# Patient Record
Sex: FEMALE | Race: BLACK OR AFRICAN AMERICAN | NOT HISPANIC OR LATINO | Employment: FULL TIME | URBAN - METROPOLITAN AREA
[De-identification: names, ages, dates, MRNs, and addresses within clinical notes are randomized per-mention and may not be internally consistent; named-entity substitution may affect disease eponyms.]

---

## 2017-12-06 ENCOUNTER — APPOINTMENT (OUTPATIENT)
Dept: GENERAL RADIOLOGY | Facility: CLINIC | Age: 27
End: 2017-12-06
Attending: PHYSICIAN ASSISTANT
Payer: COMMERCIAL

## 2017-12-06 ENCOUNTER — HOSPITAL ENCOUNTER (EMERGENCY)
Facility: CLINIC | Age: 27
Discharge: HOME | End: 2017-12-06
Attending: PHYSICIAN ASSISTANT | Admitting: PHYSICIAN ASSISTANT
Payer: COMMERCIAL

## 2017-12-06 VITALS
WEIGHT: 130 LBS | HEART RATE: 68 BPM | RESPIRATION RATE: 16 BRPM | SYSTOLIC BLOOD PRESSURE: 119 MMHG | DIASTOLIC BLOOD PRESSURE: 77 MMHG | HEIGHT: 60 IN | OXYGEN SATURATION: 100 % | TEMPERATURE: 98.5 F | BODY MASS INDEX: 25.52 KG/M2

## 2017-12-06 DIAGNOSIS — J06.9 URI WITH COUGH AND CONGESTION: ICD-10-CM

## 2017-12-06 LAB
DEPRECATED S PYO AG THROAT QL EIA: NORMAL
FLUAV+FLUBV AG SPEC QL: NEGATIVE
FLUAV+FLUBV AG SPEC QL: NEGATIVE
SPECIMEN SOURCE: NORMAL
SPECIMEN SOURCE: NORMAL

## 2017-12-06 PROCEDURE — 25000125 ZZHC RX 250: Performed by: PHYSICIAN ASSISTANT

## 2017-12-06 PROCEDURE — 87081 CULTURE SCREEN ONLY: CPT | Performed by: PHYSICIAN ASSISTANT

## 2017-12-06 PROCEDURE — 99284 EMERGENCY DEPT VISIT MOD MDM: CPT | Mod: 25

## 2017-12-06 PROCEDURE — 87804 INFLUENZA ASSAY W/OPTIC: CPT | Performed by: PHYSICIAN ASSISTANT

## 2017-12-06 PROCEDURE — 71020 XR CHEST 2 VW: CPT

## 2017-12-06 PROCEDURE — 87077 CULTURE AEROBIC IDENTIFY: CPT | Performed by: PHYSICIAN ASSISTANT

## 2017-12-06 PROCEDURE — 87880 STREP A ASSAY W/OPTIC: CPT | Performed by: PHYSICIAN ASSISTANT

## 2017-12-06 PROCEDURE — 25000132 ZZH RX MED GY IP 250 OP 250 PS 637: Performed by: PHYSICIAN ASSISTANT

## 2017-12-06 RX ORDER — IBUPROFEN 600 MG/1
600 TABLET, FILM COATED ORAL ONCE
Status: COMPLETED | OUTPATIENT
Start: 2017-12-06 | End: 2017-12-06

## 2017-12-06 RX ORDER — ONDANSETRON 4 MG/1
4 TABLET, ORALLY DISINTEGRATING ORAL ONCE
Status: COMPLETED | OUTPATIENT
Start: 2017-12-06 | End: 2017-12-06

## 2017-12-06 RX ADMIN — IBUPROFEN 600 MG: 600 TABLET ORAL at 13:21

## 2017-12-06 RX ADMIN — ONDANSETRON 4 MG: 4 TABLET, ORALLY DISINTEGRATING ORAL at 12:14

## 2017-12-06 ASSESSMENT — ENCOUNTER SYMPTOMS
NAUSEA: 1
LIGHT-HEADEDNESS: 1
VOMITING: 1
SORE THROAT: 1
COUGH: 1
FEVER: 1
HEADACHES: 1

## 2017-12-06 NOTE — ED PROVIDER NOTES
History     Chief Complaint:  URI    HPI   Ana Enriquez is a 27 year old female who presents to the emergency department today for evaluation of upper respiratory infection. The patient reports for the past 2 days she has had congestion, sore throat, cough with production of phlegm, lightheadedness, and headache. She also reports a few episode of vomiting but has been able to tolerate water today. She reports fever of 100.5 yesterday that resolved after Dayquil and Nyquil. The last dose of Dayquil was taken at 1030 this morning. She reports recent exposure to her son who is recovering from strep throat and recently had a cold with ear infection. She denies ear pain.    Allergies:  Penicillins     Medications:    Valtrex   Protopic cream   Hydrocortisone cream     Past Medical History:    Abnormal Pap smear  Migraine   Muscle spasms of head and/or neck     Past Surgical History:    Surgical history reviewed. No pertinent surgical history.     Family History:    History reviewed. No pertinent family history.     Social History:  Smoking Status: Current Every Day Smoker  Alcohol Use: Positive   Marital Status:  Single [1]     Review of Systems   Constitutional: Positive for fever (100.5; resolved).   HENT: Positive for congestion and sore throat.    Respiratory: Positive for cough (with production of phlegm).    Gastrointestinal: Positive for nausea and vomiting.   Neurological: Positive for light-headedness and headaches.   All other systems reviewed and are negative.    Physical Exam     Patient Vitals for the past 24 hrs:   BP Temp Temp src Pulse Resp SpO2 Height Weight   12/06/17 1155 119/77 98.5  F (36.9  C) Temporal 68 16 100 % 1.524 m (5') 59 kg (130 lb)     Physical Exam  Nursing note and vitals reviewed.     GENERAL: Alert, mild distress, non toxic appearing.    HEENT: Normal conjunctiva. No scleral icterus. Normal tympanic membranes bilaterally. Nasal congestion. MMM. Oropharyngeal erythema  with no tonsillar edema, no exudate. No firmness or swelling beneath tongue, no elevation of tongue. Uvula midline. No trismus. Tolerating oral secretions without difficulty.   NECK: Supple. No nuchal rigidity. No neck swelling.   CARDIAC: Normal rate and regular rhythm. Normal heart sounds. No murmurs, rubs, or gallops appreciated.   PULMONARY: CTA bilaterally. Normal breath sounds. No wheezing, crackles, or rhonchi appreciated.  No accessory muscle usage. No stridor.   ABDOMEN: Soft, non distended, non tender.   NEURO: Alert and oriented. Non focal.   MUSCULOSKELETAL: Normal range of motion.   SKIN: Skin is warm and dry. No rashes. No pallor or jaundice.   PSYCH: Normal affect and mood.     Emergency Department Course     Imaging:  Radiology findings were communicated with the patient who voiced understanding of the findings.    Chest XR,  PA & LAT     Negative.   KAY LLOYD MD  Reading per radiology    Laboratory:  Laboratory findings were communicated with the patient who voiced understanding of the findings.    Influenza A/B Antigen (Specimen: Nasopharyngeal): Negative   Rapid Strep Screen (Specimen: Throat): Negative   Beta strep group A culture: Pending     Interventions:  1214 Zofran 4 mg PO  1321 Ibuprofen 600 mg PO    Emergency Department Course:    1155 Nursing notes and vitals reviewed.    1156 I performed an exam of the patient as documented above.     1246 The patient was sent for a Chest XR,  PA & LAT while in the emergency department, results above.     1347 I discussed plan of care with the patient who was in agreement. Patient expressed understanding of the discharge instructions, questions were answered, written instructions provided, and patient discharged in satisfactory condition.      Impression & Plan      Medical Decision Making:  Ana Zena KEVIN Enriquez is a 27 year old female who presents to the emergency department today with symptoms as noted above. Findings at this time were  consistent with uncomplicated URI likely viral in nature without evidence of respiratory compromise, significant toxicity, or dehydration clinically. Rapid strep is negative. There is no clinical evidence of peritonsillar abscess, retropharyngeal abscess, Lemierre's Syndrome, epiglottis, or Fernandez's angina. There is no evidence of a more serious bacterial infection at this time. CXR without evidence of pneumonia. Influenza swab is negative.     In regards to her vomiting, suspect this is related to the viral syndrome. Abdomen benign without marked tenderness, rebound or guarding thus doubt acute intraabdominal process. No clinical evidence of dehydration. Will provide prescriptions for cough suppressant, nasal spray and nausea medication. The patient was counseled regarding supportive care and the importance for close follow up with primary care if not improving. Reviewed reasons to return to ED, including worsening symptoms, persistent high fevers, difficulty breathing, unable to tolerate oral intake, or any new concerns. The patient was in agreement with plan and discharged in satisfactory condition with all questions answered. Work note was given.      Diagnosis:    ICD-10-CM   1. URI with cough and congestion J06.9     Disposition:   The patient is discharged to home.    Discharge Medications:  Zofran  Nasonex  Robitussin     Scribe Disclosure:  Kristi TREVINO, am serving as a scribe at 11:59 AM on 12/6/2017 to document services personally performed by Julieta Sihelds PA-C based on my observations and the provider's statements to me.    [facility] EMERGENCY DEPARTMENT       Julieta Shields PA-C  12/06/17 5324

## 2017-12-06 NOTE — ED NOTES
Patient complaining of cough/cold symptoms since Sunday.  Has been taking OTC drugs with not relief.  Last dose of Dayquil at 1030.      ABCs intact.  Alert and oriented x 3.

## 2017-12-06 NOTE — DISCHARGE INSTRUCTIONS

## 2017-12-06 NOTE — ED AVS SNAPSHOT
[facility] Emergency Department    [address]    [facility] [address]    Phone:  [phone]    Fax:  [phone]                                       Ana Enriquez   MRN: [id number]    Department:  [facility] Emergency Department   Date of Visit:  12/6/2017           After Visit Summary Signature Page     I have received my discharge instructions, and my questions have been answered. I have discussed any challenges I see with this plan with the nurse or doctor.    ..........................................................................................................................................  Patient/Patient Representative Signature      ..........................................................................................................................................  Patient Representative Print Name and Relationship to Patient    ..................................................               ................................................  Date                                            Time    ..........................................................................................................................................  Reviewed by Signature/Title    ...................................................              ..............................................  Date                                                            Time

## 2017-12-06 NOTE — ED AVS SNAPSHOT
[facility] Emergency Department    [address]    [facility] [address]    Phone:  [phone]    Fax:  [phone]                                       Ana Enriquez   MRN: [id number]    Department:  [facility] Emergency Department   Date of Visit:  12/6/2017           Patient Information     Date Of Birth          1990        Your diagnoses for this visit were:     URI with cough and congestion        You were seen by Julieta Shields PA-C.      Follow-up Information     Follow up with [facility] Emergency Department.    Specialty:  EMERGENCY MEDICINE    Why:  If symptoms worsen    Contact information:    [address]  [facility] [address] [phone]        Follow up with Suleman Baires MD In 5 days.    Specialty:  Surgery    Contact information:    [address] [place] [address]  [phone]          Discharge Instructions       Discharge Instructions  Upper Respiratory Infection    The upper respiratory tract includes the sinuses, nasal passages, pharynx, and larynx. A URI, or upper respiratory infection, is an infection of any of the parts of the upper airway. Symptoms include runny nose, congestion, sneezing, sore throat, cough, and fever. URIs are almost always caused by a virus. Antibiotics do not help with viral infections, so are generally not prescribed. A URI is very contagious through coughing and nasal secretions; make sure you wash your hands often and clean surfaces after sneezing, coughing or touching them. While you should start to improve in 3 - 5 days, remember that sometimes a cough can linger for several weeks.    Generally, every Emergency Department visit should have a follow-up clinic visit with either a primary or a specialty clinic/provider. Please follow-up as instructed by your emergency provider today.    Return to the Emergency Department  if:    Any of your symptoms get much worse.    You seem very sick, like being too weak to get up.    You have chest pain or shortness of breath.     You have a severe headache.    You are vomiting (throwing up) so much you cannot keep fluids or medicines down.    You have confusion or seem unusually drowsy.    You have a seizure.    What can I do to help myself?    Fill any prescriptions the provider gave you and take them right away    If you have a fever, get plenty of rest and drink lots of fluids, especially water.    Using a humidifier or saline nose spray will also help loosen mucous.     Clothes or blankets will not change your fever. Do what is comfortable for you.    Bathing or sponging in lukewarm water may help you feel better.    Acetaminophen (Tylenol ) or ibuprofen (Advil , Motrin ) will help bring fever down and may help you feel more comfortable. Be sure to read and follow the package directions, and ask your provider if you have questions.    Do not drink alcohol.    Decongestants may help you feel better. You may use decongestant nose sprays Afrin  (oxymetazoline) or Bonifacio-Synephrine  (phenylephrine hydrochloride) for up to 3 days, or may use a decongestant tablet like Sudafed  (pseudoephedrine).  If you were given a prescription for medicine here today, be sure to read all of the information (including the package insert) that comes with your prescription.  This will include important information about the medicine, its side effects, and any warnings that you need to know about.  The pharmacist who fills the prescription can provide more information and answer questions you may have about the medicine.  If you have questions or concerns that the pharmacist cannot address, please call or return to the Emergency Department.   Remember that you can always come back to the Emergency Department if you are not able to see your regular provider in the amount of time listed above, if you get any new  symptoms, or if there is anything that worries you.     24 Hour Appointment Hotline       To make an appointment at any [facility], call [phone] ([phone]). If you don't have a family doctor or clinic, we will help you find one. [facility] clinics are conveniently located to serve the needs of you and your family.             Review of your medicines      Our records show that you are taking the medicines listed below. If these are incorrect, please call your family doctor or clinic.        Dose / Directions Last dose taken    hydrocortisone 0.5 % cream        Apply topically 2 times daily   Refills:  0        IBUPROFEN PO        Refills:  0        oxyCODONE-acetaminophen 5-325 MG per tablet   Commonly known as:  PERCOCET        Take by mouth every 4 hours as needed for moderate to severe pain   Refills:  0        * prenatal multivitamin plus iron 27-0.8 MG Tabs per tablet   Dose:  1 tablet        Take 1 tablet by mouth daily   Refills:  0        * prenatal multivitamin plus iron 27-0.8 MG Tabs per tablet   Dose:  1 tablet        Take 1 tablet by mouth daily   Refills:  0        tacrolimus 0.03 % ointment   Commonly known as:  PROTOPIC        Apply topically 2 times daily   Refills:  0        UNKNOWN TO PATIENT        Refills:  0        VALTREX PO        Refills:  0        * Notice:  This list has 2 medication(s) that are the same as other medications prescribed for you. Read the directions carefully, and ask your doctor or other care provider to review them with you.            Procedures and tests performed during your visit     Beta strep group A culture    Chest XR,  PA & LAT    Influenza A/B antigen    Rapid strep screen      Orders Needing Specimen Collection     None      Pending Results     Date and Time Order Name Status Description    12/6/2017 1204 Beta strep group A culture In process             Pending Culture Results     Date and Time Order Name Status Description    12/6/2017 1209  Beta strep group A culture In process             Pending Results Instructions     If you had any lab results that were not finalized at the time of your Discharge, you can call the ED Lab Result RN at [phone]. You will be contacted by this team for any positive Lab results or changes in treatment. The nurses are available 7 days a week from 10A to 6:30P.  You can leave a message 24 hours per day and they will return your call.        Test Results From Your Hospital Stay        12/6/2017 12:53 PM      Narrative     XR CHEST 2 VW 12/6/2017 12:51 PM    HISTORY: cough and fever;         Impression     IMPRESSION: Negative.    KAY LLOYD MD         12/6/2017  1:38 PM      Component Results     Component    Specimen Description    Throat    Rapid Strep A Screen    NEGATIVE: No Group A streptococcal antigen detected by immunoassay, await culture report.         12/6/2017  1:43 PM      Component Results     Component Value Ref Range & Units Status    Influenza A/B Agn Specimen Nasopharyngeal  Final    Influenza A Negative NEG^Negative Final    Influenza B Negative NEG^Negative Final    Test results must be correlated with clinical data. If necessary, results   should be confirmed by a molecular assay or viral culture.           12/6/2017  1:39 PM                Clinical Quality Measure: Blood Pressure Screening     Your blood pressure was checked while you were in the emergency department today. The last reading we obtained was  BP: 119/77 . Please read the guidelines below about what these numbers mean and what you should do about them.  If your systolic blood pressure (the top number) is less than 120 and your diastolic blood pressure (the bottom number) is less than 80, then your blood pressure is normal. There is nothing more that you need to do about it.  If your systolic blood pressure (the top number) is 120-139 or your diastolic blood pressure (the bottom number) is 80-89, your blood pressure may be higher  "than it should be. You should have your blood pressure rechecked within a year by a primary care provider.  If your systolic blood pressure (the top number) is 140 or greater or your diastolic blood pressure (the bottom number) is 90 or greater, you may have high blood pressure. High blood pressure is treatable, but if left untreated over time it can put you at risk for heart attack, stroke, or kidney failure. You should have your blood pressure rechecked by a primary care provider within the next 4 weeks.  If your provider in the emergency department today gave you specific instructions to follow-up with your doctor or provider even sooner than that, you should follow that instruction and not wait for up to 4 weeks for your follow-up visit.        Thank you for choosing [facility]       Thank you for choosing [facility] for your care. Our goal is always to provide you with excellent care. Hearing back from our patients is one way we can continue to improve our services. Please take a few minutes to complete the written survey that you may receive in the mail after you visit with us. Thank you!        [email or web address] Information     [email or web address] lets you send messages to your doctor, view your test results, renew your prescriptions, schedule appointments and more. To sign up, go to [email or web address] . Click on \"Log in\" on the left side of the screen, which will take you to the Welcome page. Then click on \"Sign up Now\" on the right side of the page.     You will be asked to enter the access code listed below, as well as some personal information. Please follow the directions to create your username and password.     Your access code is: [email or web address]  Expires: 3/6/2018  1:49 PM     Your access code will  in 90 days. If you need help or a new code, please call your [facility] clinic or [phone].        Care EveryWhere ID     This is your Care EveryWhere ID. This could be used by other organizations to access your [facility] " medical records  [id number]        Equal Access to Services     MEMO GARCIA : Glenn Hammonds, pastora lal, raven magana. So [facility] [phone].    ATENCIÓN: Si habla español, tiene a tony disposición servicios gratuitos de asistencia lingüística. Llame al [phone].    We comply with applicable federal civil rights laws and [place] laws. We do not discriminate on the basis of race, color, national origin, age, disability, sex, sexual orientation, or gender identity.            After Visit Summary       This is your record. Keep this with you and show to your community pharmacist(s) and doctor(s) at your next visit.

## 2017-12-09 ENCOUNTER — TELEPHONE (OUTPATIENT)
Dept: EMERGENCY MEDICINE | Facility: CLINIC | Age: 27
End: 2017-12-09

## 2017-12-09 DIAGNOSIS — J02.0 STREP THROAT: ICD-10-CM

## 2017-12-09 LAB
BACTERIA SPEC CULT: ABNORMAL
Lab: ABNORMAL
SPECIMEN SOURCE: ABNORMAL

## 2017-12-09 RX ORDER — AZITHROMYCIN 500 MG/1
500 TABLET, FILM COATED ORAL DAILY
Qty: 5 TABLET | Refills: 0 | Status: SHIPPED | OUTPATIENT
Start: 2017-12-09 | End: 2017-12-14

## 2017-12-09 NOTE — TELEPHONE ENCOUNTER
[facility] Emergency Department Lab result notification [Adult-Female]    [place] ED lab result protocol used  Beta Hemolytic Strep Protocol    Reason for call  Notify of lab results, assess symptoms,  review ED providers recommendations/discharge instructions (if necessary) and advise per ED lab result f/u protocol    Lab Result (including Rx patient on, if applicable)  Final Beta Hemolytic Strep culture report on 12/9/17 shows the presence of bacteria(s):  Beta hemolytic Streptococcus group A  Antibiotic prescribed upon discharge from the [place] ED: None  As per  ED lab result protocol, advise per Strep protocol.    Information table from ED Provider visit on 12/6/17  ED diagnosis   URI with cough and congestion   ED provider   Julieta Shields PA-C   Symptoms reported at ED visit (Chief complaint, HPI) Ana Enriquez is a 27 year old female who presents to the emergency department today for evaluation of upper respiratory infection. The patient reports for the past 2 days she has had congestion, sore throat, cough with production of phlegm, lightheadedness, and headache. She also reports a few episode of vomiting but has been able to tolerate water today. She reports fever of 100.5 yesterday that resolved after Dayquil and Nyquil. The last dose of Dayquil was taken at 1030 this morning. She reports recent exposure to her son who is recovering from strep throat and recently had a cold with ear infection. She denies ear pain.   ED providers Impression and Plan (applicable information) Ana Enriquez is a 27 year old female who presents to the emergency department today with symptoms as noted above. Findings at this time were consistent with uncomplicated URI likely viral in nature without evidence of respiratory compromise, significant toxicity, or dehydration clinically. Rapid strep is negative. There is no clinical evidence of peritonsillar abscess,  "retropharyngeal abscess, Lemierre's Syndrome, epiglottis, or Fernandez's angina. There is no evidence of a more serious bacterial infection at this time. CXR without evidence of pneumonia. Influenza swab is negative.      In regards to her vomiting, suspect this is related to the viral syndrome. Abdomen benign without marked tenderness, rebound or guarding thus doubt acute intraabdominal process. No clinical evidence of dehydration. Will provide prescriptions for cough suppressant, nasal spray and nausea medication. The patient was counseled regarding supportive care and the importance for close follow up with primary care if not improving. Reviewed reasons to return to ED, including worsening symptoms, persistent high fevers, difficulty breathing, unable to tolerate oral intake, or any new concerns. The patient was in agreement with plan and discharged in satisfactory condition with all questions answered. Work note was given.        Significant Medical hx, if applicable None   Coumadin/Warfarin [Yes /No] No   Creatinine Level (mg/dl) N/A   Creatinine clearance (ml/min), if applicable N/A   Pregnant (Yes/No/NA) No   Breastfeeding (Yes/No/NA) No   Allergies PCN   Weight, if applicable 59 Kg      RN Assessment (Patient s current Symptoms), include time called.  [Insert Left message here if message left]  Ana feeling \"okay\" since taking Tylenol.        RN Recommendations/Instructions per [place] ED lab result protocol  Patient notified of lab result and treatment recommendations.  Rx for Azithromycin [facility] [place]  RN reviewed information about strep throat including infection control.     Please Contact your PCP clinic or return to the Emergency department if your:    Symptoms return.    Symptoms do not improve after 3 days on antibiotic.    Symptoms do not resolve after completing antibiotic.    Symptoms worsen or other concerning symptom's.    PCP follow-up Questions asked: " NO    Anabel Mcnamara, RN    [facility] RN  Lung Nodule and ED Lab Results F/U RN  Epic pool (ED late result f/u RN) : [phone] [id number]  Ph # [phone]    Copy of Lab result   Order   Beta strep group A culture [[id number]] (Order [id number])   Exam Information   Exam Date Exam Time Accession # Results    12/6/17 12:04 PM [id number]    Component Results   Component Collected Lab   Specimen Description 12/06/2017 12:04    Throat   Special Requests 12/06/2017 12:04 PM 75   Specimen collected in eSwab transport (white cap)   Culture Micro (Abnormal) 12/06/2017 12:04    Light growth   Beta hemolytic Streptococcus group A

## 2018-07-31 ENCOUNTER — APPOINTMENT (OUTPATIENT)
Dept: ULTRASOUND IMAGING | Facility: CLINIC | Age: 28
End: 2018-07-31
Attending: EMERGENCY MEDICINE

## 2018-07-31 ENCOUNTER — HOSPITAL ENCOUNTER (EMERGENCY)
Facility: CLINIC | Age: 28
Discharge: HOME | End: 2018-07-31
Attending: EMERGENCY MEDICINE | Admitting: EMERGENCY MEDICINE

## 2018-07-31 VITALS
BODY MASS INDEX: 29.25 KG/M2 | SYSTOLIC BLOOD PRESSURE: 127 MMHG | TEMPERATURE: 98.1 F | OXYGEN SATURATION: 100 % | DIASTOLIC BLOOD PRESSURE: 83 MMHG | HEIGHT: 60 IN | HEART RATE: 78 BPM | WEIGHT: 149 LBS | RESPIRATION RATE: 18 BRPM

## 2018-07-31 DIAGNOSIS — D64.9 ANEMIA, UNSPECIFIED TYPE: ICD-10-CM

## 2018-07-31 DIAGNOSIS — N93.9 VAGINAL BLEEDING: ICD-10-CM

## 2018-07-31 LAB
B-HCG FREE SERPL-ACNC: <5 IU/L
BASOPHILS # BLD AUTO: 0 10E9/L (ref 0–0.2)
BASOPHILS NFR BLD AUTO: 1 %
DIFFERENTIAL METHOD BLD: ABNORMAL
EOSINOPHIL # BLD AUTO: 0.3 10E9/L (ref 0–0.7)
EOSINOPHIL NFR BLD AUTO: 9.8 %
ERYTHROCYTE [DISTWIDTH] IN BLOOD BY AUTOMATED COUNT: 13.6 % (ref 10–15)
HCT VFR BLD AUTO: 36 % (ref 35–47)
HGB BLD-MCNC: 10.9 G/DL (ref 11.7–15.7)
IMM GRANULOCYTES # BLD: 0 10E9/L (ref 0–0.4)
IMM GRANULOCYTES NFR BLD: 0.3 %
LYMPHOCYTES # BLD AUTO: 1.4 10E9/L (ref 0.8–5.3)
LYMPHOCYTES NFR BLD AUTO: 45.6 %
MCH RBC QN AUTO: 27.2 PG (ref 26.5–33)
MCHC RBC AUTO-ENTMCNC: 30.3 G/DL (ref 31.5–36.5)
MCV RBC AUTO: 90 FL (ref 78–100)
MONOCYTES # BLD AUTO: 0.4 10E9/L (ref 0–1.3)
MONOCYTES NFR BLD AUTO: 11.8 %
NEUTROPHILS # BLD AUTO: 0.9 10E9/L (ref 1.6–8.3)
NEUTROPHILS NFR BLD AUTO: 31.5 %
NRBC # BLD AUTO: 0 10*3/UL
NRBC BLD AUTO-RTO: 0 /100
PLATELET # BLD AUTO: 189 10E9/L (ref 150–450)
RBC # BLD AUTO: 4.01 10E12/L (ref 3.8–5.2)
WBC # BLD AUTO: 3 10E9/L (ref 4–11)

## 2018-07-31 PROCEDURE — 85025 COMPLETE CBC W/AUTO DIFF WBC: CPT | Performed by: EMERGENCY MEDICINE

## 2018-07-31 PROCEDURE — 76801 OB US < 14 WKS SINGLE FETUS: CPT

## 2018-07-31 PROCEDURE — 99284 EMERGENCY DEPT VISIT MOD MDM: CPT | Mod: 25

## 2018-07-31 PROCEDURE — 84702 CHORIONIC GONADOTROPIN TEST: CPT

## 2018-07-31 ASSESSMENT — ENCOUNTER SYMPTOMS
CHILLS: 1
FEVER: 0
VOMITING: 0
NAUSEA: 0
BACK PAIN: 1

## 2018-07-31 NOTE — LETTER
July 31, 2018      To Whom It May Concern:      Ana Zena KEVIN Enriquez was seen in our Emergency Department today, 07/31/18.  I expect her condition to improve over the next 1-2 days.  She may return to work/school when improved.    Sincerely,        Rosaura Lorenzo RN
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
## 2018-07-31 NOTE — ED AVS SNAPSHOT
[facility] Emergency Department    [address] [place] [address]    Phone:  [phone]    Fax:  [phone]                                       Ana Enriquez   MRN: [id number]    Department:  [facility] Emergency Department   Date of Visit:  7/31/2018           Patient Information     Date Of Birth          1990        Your diagnoses for this visit were:     Vaginal bleeding        You were seen by Rolf Corbin MD.      Follow-up Information     Follow up with Suleman Baires MD In 3 days.    Specialty:  Surgery    Contact information:    [facility]  [address]  [facility] [id number] [phone]          Discharge Instructions       Discharge Instructions  Vaginal Bleeding    You were seen today for unusual vaginal bleeding. Heavy or irregular bleeding may be caused by many different things such as hormone changes, infection, birth control, or cancer. At this time your provider does not find that your bleeding is a sign of anything dangerous or life-threatening, and you have not lost enough blood to be dangerous.  However, sometimes the signs of serious illness do not show up right away.  If you have new or worse symptoms, you may need to be seen again in the Emergency Department or by your primary provider.      Generally, every Emergency Department visit should have a follow-up clinic visit with either a primary or a specialty clinic/provider. Please follow-up as instructed by your emergency provider today.    Return to the Emergency Department if:    You feel lightheaded or faint.    Your bleeding becomes much heavier than it is now.    You have severe cramping or abdominal (belly) pain.    You have any new or different symptoms.    Treatment:    Motrin  or Advil  (ibuprofen) can help relieve cramps and can also decrease bleeding. You may use this according to the directions on the package. Do not use a medicine that  you are allergic to, or if your provider has told you not to use it.    Hormone pills or birth control pills are occasionally prescribed to help control the bleeding but often this is left to an OB/GYN provider. These can cause problems if you have a history of blood clots or stroke, so tell your provider if you have these problems before you leave.    Iron tablets may be recommended if you have anemia (low blood count.) Iron can cause constipation, so be sure to have plenty of fiber in your diet and let your provider know if you have problems.    Drinking plenty of fluid is important. Be sure to drink extra water or other healthy drinks.  If you were given a prescription for medicine here today, be sure to read all of the information (including the package insert) that comes with your prescription.  This will include important information about the medicine, its side effects, and any warnings that you need to know about.  The pharmacist who fills the prescription can provide more information and answer questions you may have about the medicine.  If you have questions or concerns that the pharmacist cannot address, please call or return to the Emergency Department.     Remember that you can always come back to the Emergency Department if you are not able to see your regular provider in the amount of time listed above, if you get any new symptoms, or if there is anything that worries you.      24 Hour Appointment Hotline       To make an appointment at any [facility], call [phone] ([phone]). If you don't have a family doctor or clinic, we will help you find one. [facility] are conveniently located to serve the needs of you and your family.             Review of your medicines      Our records show that you are taking the medicines listed below. If these are incorrect, please call your family doctor or clinic.        Dose / Directions Last dose taken    hydrocortisone 0.5 % cream        Apply  topically 2 times daily   Refills:  0        IBUPROFEN PO        Refills:  0        oxyCODONE-acetaminophen 5-325 MG per tablet   Commonly known as:  PERCOCET        Take by mouth every 4 hours as needed for moderate to severe pain   Refills:  0        * prenatal multivitamin plus iron 27-0.8 MG Tabs per tablet   Dose:  1 tablet        Take 1 tablet by mouth daily   Refills:  0        * prenatal multivitamin plus iron 27-0.8 MG Tabs per tablet   Dose:  1 tablet        Take 1 tablet by mouth daily   Refills:  0        tacrolimus 0.03 % ointment   Commonly known as:  PROTOPIC        Apply topically 2 times daily   Refills:  0        UNKNOWN TO PATIENT        Refills:  0        VALTREX PO        Refills:  0        * Notice:  This list has 2 medication(s) that are the same as other medications prescribed for you. Read the directions carefully, and ask your doctor or other care provider to review them with you.            Procedures and tests performed during your visit     CBC + differential    ISTAT HCG Quantitative Pregnancy Nursing POCT    ISTAT HCG Quantitative Pregnancy POCT    OB  US 1st trimester w transvag      Orders Needing Specimen Collection     None      Pending Results     Date and Time Order Name Status Description    7/31/2018 1046 OB  US 1st trimester w transvag Preliminary             Pending Culture Results     No orders found from 7/29/2018 to 8/1/2018.            Pending Results Instructions     If you had any lab results that were not finalized at the time of your Discharge, you can call the ED Lab Result RN at [phone]. You will be contacted by this team for any positive Lab results or changes in treatment. The nurses are available 7 days a week from 10A to 6:30P.  You can leave a message 24 hours per day and they will return your call.        Test Results From Your Hospital Stay        7/31/2018 12:17 PM      Narrative     ULTRASOUND OBSTETRIC <14 WEEKS WITH TRANSVAGINAL SINGLE July 31,    12:06 PM    HISTORY: Bleeding in pregnancy, \R\6 wk LMP.    TECHNIQUE: Transabdominal and transvaginal imaging were performed.  Transvaginal imaging was performed to better evaluate the uterus and  gestational sac.    COMPARISON: None.    FINDINGS: No evidence of an intrauterine gestational sac. Endometrial  stripe is normal measuring only 3 mm in thickness. Findings could  reflect a missed spontaneous  in light of patient's history of  bleeding during known pregnancy.    Right ovary: Normal measuring 3.1 x 2.0 x 1.7 cm.  Left ovary: Normal measuring 2.3 x 2.1 x 1.5 cm.  Adnexal mass: None.  Free pelvic fluid: None.        Impression     IMPRESSION: No evidence of intrauterine gestational sac or endometrial  thickening. No fluid or debris is noted in the endometrial cavity  suggesting missed spontaneous . Correlate with beta hCG and  follow-up ultrasound as necessary. Ovaries are unremarkable. No  adnexal mass or free pelvic fluid.           2018 11:21 AM      Component Results     Component Value Ref Range & Units Status    WBC 3.0 (L) 4.0 - 11.0 10e9/L Final    RBC Count 4.01 3.8 - 5.2 10e12/L Final    Hemoglobin 10.9 (L) 11.7 - 15.7 g/dL Final    Hematocrit 36.0 35.0 - 47.0 % Final    MCV 90 78 - 100 fl Final    MCH 27.2 26.5 - 33.0 pg Final    MCHC 30.3 (L) 31.5 - 36.5 g/dL Final    RDW 13.6 10.0 - 15.0 % Final    Platelet Count 189 150 - 450 10e9/L Final    Diff Method Automated Method  Final    % Neutrophils 31.5 % Final    % Lymphocytes 45.6 % Final    % Monocytes 11.8 % Final    % Eosinophils 9.8 % Final    % Basophils 1.0 % Final    % Immature Granulocytes 0.3 % Final    Nucleated RBCs 0 0 /100 Final    Absolute Neutrophil 0.9 (L) 1.6 - 8.3 10e9/L Final    Absolute Lymphocytes 1.4 0.8 - 5.3 10e9/L Final    Absolute Monocytes 0.4 0.0 - 1.3 10e9/L Final    Absolute Eosinophils 0.3 0.0 - 0.7 10e9/L Final    Absolute Basophils 0.0 0.0 - 0.2 10e9/L Final    Abs Immature Granulocytes  0.0 0 - 0.4 10e9/L Final    Absolute Nucleated RBC 0.0  Final         7/31/2018 11:29 AM      Component Results     Component Value Ref Range & Units Status    HCG Quantitative Serum <5.0 <5.0 IU/L Final                Clinical Quality Measure: Blood Pressure Screening     Your blood pressure was checked while you were in the emergency department today. The last reading we obtained was  BP: 127/83 . Please read the guidelines below about what these numbers mean and what you should do about them.  If your systolic blood pressure (the top number) is less than 120 and your diastolic blood pressure (the bottom number) is less than 80, then your blood pressure is normal. There is nothing more that you need to do about it.  If your systolic blood pressure (the top number) is 120-139 or your diastolic blood pressure (the bottom number) is 80-89, your blood pressure may be higher than it should be. You should have your blood pressure rechecked within a year by a primary care provider.  If your systolic blood pressure (the top number) is 140 or greater or your diastolic blood pressure (the bottom number) is 90 or greater, you may have high blood pressure. High blood pressure is treatable, but if left untreated over time it can put you at risk for heart attack, stroke, or kidney failure. You should have your blood pressure rechecked by a primary care provider within the next 4 weeks.  If your provider in the emergency department today gave you specific instructions to follow-up with your doctor or provider even sooner than that, you should follow that instruction and not wait for up to 4 weeks for your follow-up visit.        Thank you for choosing [facility]       Thank you for choosing [facility] for your care. Our goal is always to provide you with excellent care. Hearing back from our patients is one way we can continue to improve our services. Please take a few minutes to complete the written survey that you may receive in  "the mail after you visit with us. Thank you!        [employer] Information     [employer] lets you send messages to your doctor, view your test results, renew your prescriptions, schedule appointments and more. To sign up, go to [email or web address]"t . Click on \"Log in\" on the left side of the screen, which will take you to the Welcome page. Then click on \"Sign up Now\" on the right side of the page.     You will be asked to enter the access code listed below, as well as some personal information. Please follow the directions to create your username and password.     Your access code is: [email or web address]  Expires: 10/29/2018 12:40 PM     Your access code will  in 90 days. If you need help or a new code, please call your [facility] clinic or [phone].        Care EveryWhere ID     This is your Care EveryWhere ID. This could be used by other organizations to access your [facility] medical records  [id number]        Equal Access to Services     KENNETH GARCIA : Hadii aad ku hadasho Sopatrica, waaxda luqadaha, qaybta kaalmada adelaurence, raven elizondo . So [facility] [phone].    ATENCIÓN: Si habla español, tiene a tony disposición servicios gratuitos de asistencia lingüística. Llame al [phone].    We comply with applicable federal civil rights laws and [place] laws. We do not discriminate on the basis of race, color, national origin, age, disability, sex, sexual orientation, or gender identity.            After Visit Summary       This is your record. Keep this with you and show to your community pharmacist(s) and doctor(s) at your next visit.                  "

## 2018-07-31 NOTE — DISCHARGE INSTRUCTIONS
Discharge Instructions  Vaginal Bleeding    You were seen today for unusual vaginal bleeding. Heavy or irregular bleeding may be caused by many different things such as hormone changes, infection, birth control, or cancer. At this time your provider does not find that your bleeding is a sign of anything dangerous or life-threatening, and you have not lost enough blood to be dangerous.  However, sometimes the signs of serious illness do not show up right away.  If you have new or worse symptoms, you may need to be seen again in the Emergency Department or by your primary provider.      Generally, every Emergency Department visit should have a follow-up clinic visit with either a primary or a specialty clinic/provider. Please follow-up as instructed by your emergency provider today.    Return to the Emergency Department if:    You feel lightheaded or faint.    Your bleeding becomes much heavier than it is now.    You have severe cramping or abdominal (belly) pain.    You have any new or different symptoms.    Treatment:    Motrin  or Advil  (ibuprofen) can help relieve cramps and can also decrease bleeding. You may use this according to the directions on the package. Do not use a medicine that you are allergic to, or if your provider has told you not to use it.    Hormone pills or birth control pills are occasionally prescribed to help control the bleeding but often this is left to an OB/GYN provider. These can cause problems if you have a history of blood clots or stroke, so tell your provider if you have these problems before you leave.    Iron tablets may be recommended if you have anemia (low blood count.) Iron can cause constipation, so be sure to have plenty of fiber in your diet and let your provider know if you have problems.    Drinking plenty of fluid is important. Be sure to drink extra water or other healthy drinks.  If you were given a prescription for medicine here today, be sure to read all of the  information (including the package insert) that comes with your prescription.  This will include important information about the medicine, its side effects, and any warnings that you need to know about.  The pharmacist who fills the prescription can provide more information and answer questions you may have about the medicine.  If you have questions or concerns that the pharmacist cannot address, please call or return to the Emergency Department.     Remember that you can always come back to the Emergency Department if you are not able to see your regular provider in the amount of time listed above, if you get any new symptoms, or if there is anything that worries you.
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
## 2018-07-31 NOTE — ED AVS SNAPSHOT
[facility] Emergency Department    [address]    [facility] [address]    Phone:  [phone]    Fax:  [phone]                                       Ana Enriquez   MRN: [id number]    Department:  [facility] Emergency Department   Date of Visit:  7/31/2018           After Visit Summary Signature Page     I have received my discharge instructions, and my questions have been answered. I have discussed any challenges I see with this plan with the nurse or doctor.    ..........................................................................................................................................  Patient/Patient Representative Signature      ..........................................................................................................................................  Patient Representative Print Name and Relationship to Patient    ..................................................               ................................................  Date                                            Time    ..........................................................................................................................................  Reviewed by Signature/Title    ...................................................              ..............................................  Date                                                            Time

## 2019-03-16 ENCOUNTER — HOSPITAL ENCOUNTER (EMERGENCY)
Facility: CLINIC | Age: 29
Discharge: HOME | End: 2019-03-16
Attending: EMERGENCY MEDICINE | Admitting: EMERGENCY MEDICINE
Payer: MEDICAID

## 2019-03-16 VITALS
HEART RATE: 70 BPM | TEMPERATURE: 98.2 F | BODY MASS INDEX: 27.48 KG/M2 | DIASTOLIC BLOOD PRESSURE: 70 MMHG | WEIGHT: 140 LBS | OXYGEN SATURATION: 97 % | SYSTOLIC BLOOD PRESSURE: 118 MMHG | HEIGHT: 60 IN | RESPIRATION RATE: 18 BRPM

## 2019-03-16 DIAGNOSIS — O46.90 VAGINAL BLEEDING IN PREGNANCY: ICD-10-CM

## 2019-03-16 LAB
B-HCG SERPL-ACNC: ABNORMAL IU/L (ref 0–5)
BASOPHILS # BLD AUTO: 0 10E9/L (ref 0–0.2)
BASOPHILS NFR BLD AUTO: 0.6 %
DIFFERENTIAL METHOD BLD: ABNORMAL
EOSINOPHIL # BLD AUTO: 0.3 10E9/L (ref 0–0.7)
EOSINOPHIL NFR BLD AUTO: 4.1 %
ERYTHROCYTE [DISTWIDTH] IN BLOOD BY AUTOMATED COUNT: 13.8 % (ref 10–15)
HCT VFR BLD AUTO: 37.4 % (ref 35–47)
HGB BLD-MCNC: 11.7 G/DL (ref 11.7–15.7)
IMM GRANULOCYTES # BLD: 0 10E9/L (ref 0–0.4)
IMM GRANULOCYTES NFR BLD: 0.2 %
LYMPHOCYTES # BLD AUTO: 2.4 10E9/L (ref 0.8–5.3)
LYMPHOCYTES NFR BLD AUTO: 38.3 %
MCH RBC QN AUTO: 27 PG (ref 26.5–33)
MCHC RBC AUTO-ENTMCNC: 31.3 G/DL (ref 31.5–36.5)
MCV RBC AUTO: 86 FL (ref 78–100)
MONOCYTES # BLD AUTO: 0.6 10E9/L (ref 0–1.3)
MONOCYTES NFR BLD AUTO: 9.6 %
NEUTROPHILS # BLD AUTO: 3 10E9/L (ref 1.6–8.3)
NEUTROPHILS NFR BLD AUTO: 47.2 %
NRBC # BLD AUTO: 0 10*3/UL
NRBC BLD AUTO-RTO: 0 /100
PLATELET # BLD AUTO: 224 10E9/L (ref 150–450)
RBC # BLD AUTO: 4.33 10E12/L (ref 3.8–5.2)
WBC # BLD AUTO: 6.4 10E9/L (ref 4–11)

## 2019-03-16 PROCEDURE — 76705 ECHO EXAM OF ABDOMEN: CPT

## 2019-03-16 PROCEDURE — 84702 CHORIONIC GONADOTROPIN TEST: CPT | Performed by: EMERGENCY MEDICINE

## 2019-03-16 PROCEDURE — 36415 COLL VENOUS BLD VENIPUNCTURE: CPT | Performed by: EMERGENCY MEDICINE

## 2019-03-16 PROCEDURE — 99284 EMERGENCY DEPT VISIT MOD MDM: CPT | Mod: 25

## 2019-03-16 PROCEDURE — 85025 COMPLETE CBC W/AUTO DIFF WBC: CPT | Performed by: EMERGENCY MEDICINE

## 2019-03-16 RX ORDER — SWAB
1 SWAB, NON-MEDICATED MISCELLANEOUS DAILY
Qty: 30 EACH | Refills: 0 | Status: ON HOLD | OUTPATIENT
Start: 2019-03-16 | End: 2019-06-21

## 2019-03-16 ASSESSMENT — ENCOUNTER SYMPTOMS
VOMITING: 0
ABDOMINAL PAIN: 0
NAUSEA: 0

## 2019-03-16 ASSESSMENT — MIFFLIN-ST. JEOR: SCORE: 1281.54

## 2019-03-16 NOTE — ED TRIAGE NOTES
A&Ox4. ABC's intact. Pt c/o vaginal bleeding intermittently for the past 2 days.  Denies pain at this time, but has had some back pain.  States she is about 9 weeks pregnant.

## 2019-03-16 NOTE — ED AVS SNAPSHOT
[facility] Emergency Department  [address]  [facility] [address]  Phone:  [phone]  Fax:  [phone]                                    Ana Erniquez   MRN: [id number]    Department:  [facility] Emergency Department   Date of Visit:  3/16/2019           After Visit Summary Signature Page    I have received my discharge instructions, and my questions have been answered. I have discussed any challenges I see with this plan with the nurse or doctor.    ..........................................................................................................................................  Patient/Patient Representative Signature      ..........................................................................................................................................  Patient Representative Print Name and Relationship to Patient    ..................................................               ................................................  Date                                   Time    ..........................................................................................................................................  Reviewed by Signature/Title    ...................................................              ..............................................  Date                                               Time          22EPIC Rev 08/18

## 2019-03-17 NOTE — ED PROVIDER NOTES
History     Chief Complaint:  Vaginal bleeding    The history is provided by the patient.      Ana Enriquez is a 28 year old female who presents to the emergency department with her significant other for evaluation of vaginal bleeding. Yesterday, the patient reports the sudden onset of vaginal bleeding. Of note, the patient believes to be pregnant as she has not had her period in nine weeks, last one being on 18, as well as taking a urine pregnancy test at [facility] that came back positive. Of note, the patient has been pregnant four other times, with one  at 6-8 weeks in. Today, the patient's vaginal bleeding stopped. The abnormal vaginal bleeding prompted the patient to seek evaluation in the emergency room today.    Here, the patient denies any abdominal pain, nausea, or vomiting. She is not spotting right now, or had any pelvic surgery. Of note, the patient usually has a normal menstrual cycle. She has a OBGYN appointment this upcoming week.    Allergies:  Penicillins    Latex    Medications:    Valtrex    Past Medical History:    Enlarged thyroid  Irregular periods    Past Surgical History:    Colposcopy x3    Family History:    Diabetes    Social History:  Positive for tobacco use.  Positive for alcohol use.  Negative for drug use.  Marital Status:  Single      Review of Systems   Gastrointestinal: Negative for abdominal pain, nausea and vomiting.   Genitourinary: Positive for vaginal bleeding.   All other systems reviewed and are negative.        Physical Exam     Patient Vitals for the past 24 hrs:   BP Temp Temp src Pulse Heart Rate Resp SpO2 Height Weight   19 2056 118/70 -- -- 70 -- 18 97 % -- --   19 1812 113/74 98.2  F (36.8  C) Temporal -- 78 16 94 % 1.524 m (5') 63.5 kg (140 lb)     Physical Exam    HEENT:  mmm  Neck: supple  CV: ppi, regular   Resp: speaking in full sentences with any resp distress  Abd: abdomen is soft without significant  tenderness, masses, organomegaly or guarding    Skin: warm dry well perfused  Neuro: Alert, no gross motor or sensory deficits,  gait stable        Emergency Department Course   Laboratory:  CBC: WBC: 6.4, HGB: 11.7, PLT: 224  HCG Quantitative Pregnancy: 45,576 (H)      Bedside US --> see [facility]        Emergency Department Course:  Nursing notes and vitals reviewed.  I performed an exam of the patient as documented above.     Blood drawn. This was sent to the lab for further testing, results above.     I rechecked the patient and discussed the results of her workup thus far.     Findings and plan explained to the Patient and significant other. Patient discharged home with instructions regarding supportive care, medications, and reasons to return. The importance of close follow-up was reviewed.     I personally reviewed the laboratory results with the Patient and significant other and answered all related questions prior to discharge.     Impression & Plan    Medical Decision Making:   here with vaginal bleeding in 1st trimester.  No assoc abd pain, also not having any n/v.  No assisted fertility.    US here showing living IUP.  No pelvic free fluid.  Plan is to start prenatal vitamin, f/u OB GYN.  Previsouly known to be O + no indication for RhoGam    Critical Care time:  none    Diagnosis:    ICD-10-CM    1. Vaginal bleeding in pregnancy O46.90        Disposition:  discharged to home with her significant other     Discharge Medications:     Medication List      Modified    * prenatal multivitamin w/iron 27-0.8 MG tablet  What changed:  Another medication with the same name was added. Make sure you understand how and when to take each.     * prenatal multivitamin w/iron 27-0.8 MG tablet  What changed:  Another medication with the same name was added. Make sure you understand how and when to take each.     * prenatal multivitamin  with iron 28-0.8 MG Tabs  1 tablet, Oral, DAILY  What changed:  You were  already taking a medication with the same name, and this prescription was added. Make sure you understand how and when to take each.         * This list has 3 medication(s) that are the same as other medications prescribed for you. Read the directions carefully, and ask your doctor or other care provider to review them with you.              Scribe Disclosure:  I, Allyn Rubalcava, am serving as a scribe on 3/16/2019 at 7:13 PM to personally document services performed by Ramiro Lamb, * based on my observations and the provider's statements to me.     Allyn Rubalcava  3/16/2019   [facility] EMERGENCY DEPARTMENT       Ramiro Lamb MD  03/17/19 0100

## 2019-04-12 ENCOUNTER — HOSPITAL ENCOUNTER (EMERGENCY)
Facility: CLINIC | Age: 29
Discharge: HOME | End: 2019-04-13
Attending: EMERGENCY MEDICINE | Admitting: EMERGENCY MEDICINE
Payer: MEDICAID

## 2019-04-12 DIAGNOSIS — R09.A2 FOREIGN BODY SENSATION IN THROAT: ICD-10-CM

## 2019-04-12 PROCEDURE — 99283 EMERGENCY DEPT VISIT LOW MDM: CPT | Mod: 25

## 2019-04-12 PROCEDURE — 92511 NASOPHARYNGOSCOPY: CPT

## 2019-04-12 ASSESSMENT — ENCOUNTER SYMPTOMS
SORE THROAT: 1
VOMITING: 1

## 2019-04-12 ASSESSMENT — MIFFLIN-ST. JEOR: SCORE: 1356.5

## 2019-04-12 NOTE — ED AVS SNAPSHOT
[facility] Emergency Department  [address]  [facility] [address]  Phone:  [phone]  Fax:  [phone]                                    Ana Enriquez   MRN: [id number]    Department:  [facility] Emergency Department   Date of Visit:  4/12/2019           After Visit Summary Signature Page    I have received my discharge instructions, and my questions have been answered. I have discussed any challenges I see with this plan with the nurse or doctor.    ..........................................................................................................................................  Patient/Patient Representative Signature      ..........................................................................................................................................  Patient Representative Print Name and Relationship to Patient    ..................................................               ................................................  Date                                   Time    ..........................................................................................................................................  Reviewed by Signature/Title    ...................................................              ..............................................  Date                                               Time          22EPIC Rev 08/18

## 2019-04-13 VITALS
SYSTOLIC BLOOD PRESSURE: 121 MMHG | RESPIRATION RATE: 15 BRPM | WEIGHT: 156.53 LBS | TEMPERATURE: 98.4 F | HEART RATE: 82 BPM | OXYGEN SATURATION: 95 % | DIASTOLIC BLOOD PRESSURE: 75 MMHG | HEIGHT: 60 IN | BODY MASS INDEX: 30.73 KG/M2

## 2019-04-13 PROCEDURE — 25000132 ZZH RX MED GY IP 250 OP 250 PS 637: Performed by: EMERGENCY MEDICINE

## 2019-04-13 PROCEDURE — 25000125 ZZHC RX 250: Performed by: EMERGENCY MEDICINE

## 2019-04-13 RX ADMIN — LIDOCAINE HYDROCHLORIDE 30 ML: 20 SOLUTION ORAL; TOPICAL at 00:04

## 2019-04-13 NOTE — DISCHARGE INSTRUCTIONS
Keep a soft food diet until the sensation passes.  If you cannot tolerate liquids or foods, unable to swallow, voice changes or other concerns return to the ED.    Follow up with your PCP next week.

## 2019-04-13 NOTE — ED PROVIDER NOTES
History     Chief Complaint:  Pharyngitis      HPI   Ana Enriquez is a 28 year old female, approximately 15 weeks gravid, who presents to the ED for evaluation of pharyngitis. The patient states that she was eating a Dorito chip earlier tonight, when she suddenly felt it lodge in her esophagus. The patient reportedly started to gag, although she was able to pound her chest and vomit with large resolution of her discomfort. She did not truly choke. The patient states that she tried drinking water without any improvement in her throat pain. Thus, she presented to the ED for concerns of a persistent foreign body. Here in the ED, the patient denies any chest pain, shortness of breath, or persistent vomiting. This has never happened before. Of note, the patient states that there have been no complications with her pregnancy thus far.     Allergies:  Penicillins    Latex    Medications:    Hydrocortisone  Percocet  Prenatal vitamins  Protopic  Valtrex     Past Medical History:    Migraine  Meconium in amniotic fluid  HSV-1  Enlarged thyroid  Abscess of Bartholin's gland  Threatened     Past Surgical History:    The patient does not have any pertinent past surgical history.     Family History:    No past pertinent family history.    Social History:  Smoking Status: Current every day smoker  Alcohol Use: Yes - socially  Negative for drug use.   Marital Status:  Single [1]     Review of Systems   HENT: Positive for sore throat.    Gastrointestinal: Positive for vomiting.   All other systems reviewed and are negative.      Physical Exam     Patient Vitals for the past 24 hrs:   BP Temp Temp src Pulse Heart Rate Resp SpO2 Height Weight   19 0008 -- -- -- 83 -- -- 97 % -- --   195 -- -- -- -- -- -- -- -- 71 kg (156 lb 8.4 oz)   19 116/73 98.4  F (36.9  C) Oral -- 138 20 98 % 1.524 m (5') --        Physical Exam  General: Resting on the bed.  Head: No obvious trauma to  head.  Ears, Nose, Throat:  External ears normal.  Nose normal.  No pharyngeal erythema, swelling or exudate.  Midline uvula.  no trismus.  No oral abrasions or food impaction noted.  No stridor.    Eyes:  Conjunctivae clear.  Pupils are equal, round, and reactive.   Neck: Normal range of motion.  Neck supple. non tender cricothyroid.  Full range of motion.  CV: Regular rate and rhythm.  No murmurs.      Respiratory: Effort normal and breath sounds normal.  No wheezing or crackles.   Gastrointestinal: Soft.  No distension. There is no tenderness.    Neuro: Alert. Moving all extremities appropriately.  Normal speech.    Skin: Skin is warm and dry.  No rash noted.     Emergency Department Course     Procedures:  Indication: foreign body sensation   Performed by: Rosaura Otero MD  The patient's throat was anesthetized with benzocaine spray.  Fiberoptic nasopharyngoscopy was performed.  The scope was passed directly through the patient's mouth and into the oropharynx. Fiberoptic nasopharyngoscopy was performed.  Epiglottis was normal.  No oral or larynx abrasion or foreign body.  The patient tolerated the procedure well without complications.  Interpretation: normal larynx and epiglottis     Interventions:  0004 GI Cocktail (Maalox/Mylanta and viscous Lidocaine), 30 mL suspension, PO      Emergency Department Course:  Nursing notes and vitals reviewed. (2250) I performed an exam of the patient as documented above.     (2305) I performed a scope to look in the patient's throat, as per the procedure note above. I was unable to visualize the entire esophagus, although I saw no evidence of a foreign body.    RN staff recorded fetal heart tones at the request of the patient.    (5024) I reevaluated the patient and provided an update in regards to her ED course. She had been able to eat and drink here in the ED without vomiting, although she still felt persistent discomfort.    GI cocktail ordered and administered as  above.    (0013) I reevaluated the patient, whose throat discomfort had persisted.    (0049) Findings and plan explained to the Patient. Patient discharged home with instructions regarding supportive care, medications, and reasons to return. The importance of close follow-up was reviewed.     I personally reviewed all results with the Patient and answered all related questions prior to discharge.      Impression & Plan      Medical Decision Makin-year-old female, 15 weeks pregnant, who presents with a foreign body sensation in her throat.  Vital signs were initially mildly tachycardic but on repeat resolved.  Normal blood pressure.  Broad differential was pursued, including not limited to, food bolus, aspiration, choking, esophageal perforation, laceration, abrasion, etc.  Overall patient is well-appearing and nontoxic.  She is handling her secretions.  She is able to tolerate water prior to arrival.  Posterior oropharynx shows no evidence of foreign body or abrasion.  Patient is quite anxious, therefore a bedside oral scope was used to assess.  There is no evidence of any food bolus, aspiration or perforation.  Able to look down to the epiglottitis and around that area without any noted abnormalities.  She is handling secretions normally.  She is drinking and eating normally.  Her lung sounds are clear to auscultation, and there is no choking or aspiration event.  No wheezing or crackles on exam to suggest these etiologies either.  She is satting normally as well.  Normal work of breathing.  We attempted GI cocktail patient, although she still had a foreign body sensation.  She is drinking Sprite and able to eat applesauce without difficulty.  There is no evidence of food bolus or obstruction.  She is not drooling.  She is handling secretions normally.  She seems to be very fixated on this foreign body sensation but it appears to be likely an abrasion and foreign body sensation.  There is no indication for  endoscopy given that she is able to eat and drink normally, no signs of food impaction or bolus.  I suspect that patient will need to continue a soft food diet until the area heals.  I encouraged patient to follow-up with GI for continued issues with swallowing.  Patient was observed for nearly 2 hours without any worsening or changing symptoms.  She appears to be safe to discharge home.  Reassured patient that there does not appear to be any food impaction or large bolus.  Reassured her that this foreign body sensation should resolve with time.  She is reassured by this and felt comfortable going home.  I encouraged close follow-up.  Strict return precautions were discussed.    Diagnosis:    ICD-10-CM    1. Foreign body sensation in throat R09.89        Disposition:  Discharged to home      Scribe Disclosure:  I, Sussy Blandon, am serving as a scribe on 4/12/2019 at 10:50 PM to personally document services performed by Rosaura Otero MD based on my observations and the provider's statements to me.      Sussy Blandon  4/12/2019   [facility] EMERGENCY DEPARTMENT       Rosaura Otero MD  04/13/19 0058

## 2019-06-02 ENCOUNTER — HOSPITAL ENCOUNTER (OUTPATIENT)
Facility: CLINIC | Age: 29
Discharge: HOME | End: 2019-06-02
Attending: OBSTETRICS & GYNECOLOGY | Admitting: OBSTETRICS & GYNECOLOGY
Payer: COMMERCIAL

## 2019-06-02 VITALS — DIASTOLIC BLOOD PRESSURE: 55 MMHG | TEMPERATURE: 98.3 F | SYSTOLIC BLOOD PRESSURE: 95 MMHG

## 2019-06-02 PROBLEM — Z36.89 ENCOUNTER FOR TRIAGE IN PREGNANT PATIENT: Status: ACTIVE | Noted: 2019-06-02

## 2019-06-02 LAB
ALBUMIN UR-MCNC: 30 MG/DL
APPEARANCE UR: CLEAR
BILIRUB UR QL STRIP: NEGATIVE
CAOX CRY #/AREA URNS HPF: ABNORMAL /HPF
COLOR UR AUTO: YELLOW
GLUCOSE UR STRIP-MCNC: NEGATIVE MG/DL
HGB UR QL STRIP: NEGATIVE
KETONES UR STRIP-MCNC: 40 MG/DL
LEUKOCYTE ESTERASE UR QL STRIP: NEGATIVE
MUCOUS THREADS #/AREA URNS LPF: PRESENT /LPF
NITRATE UR QL: NEGATIVE
PH UR STRIP: 6 PH (ref 5–7)
RBC #/AREA URNS AUTO: 1 /HPF (ref 0–2)
SOURCE: ABNORMAL
SP GR UR STRIP: 1.03 (ref 1–1.03)
SPECIMEN SOURCE: NORMAL
SQUAMOUS #/AREA URNS AUTO: 5 /HPF (ref 0–1)
UROBILINOGEN UR STRIP-MCNC: 2 MG/DL (ref 0–2)
WBC #/AREA URNS AUTO: 1 /HPF (ref 0–5)
WET PREP SPEC: NORMAL

## 2019-06-02 PROCEDURE — 81001 URINALYSIS AUTO W/SCOPE: CPT | Performed by: OBSTETRICS & GYNECOLOGY

## 2019-06-02 PROCEDURE — G0463 HOSPITAL OUTPT CLINIC VISIT: HCPCS

## 2019-06-02 PROCEDURE — 87210 SMEAR WET MOUNT SALINE/INK: CPT | Performed by: OBSTETRICS & GYNECOLOGY

## 2019-06-02 ASSESSMENT — ACTIVITIES OF DAILY LIVING (ADL)
DRESS: 0-->INDEPENDENT
SWALLOWING: 0-->SWALLOWS FOODS/LIQUIDS WITHOUT DIFFICULTY
AMBULATION: 0-->INDEPENDENT
TRANSFERRING: 0-->INDEPENDENT
BATHING: 0-->INDEPENDENT
TOILETING: 0-->INDEPENDENT
COGNITION: 0 - NO COGNITION ISSUES REPORTED
RETIRED_COMMUNICATION: 0-->UNDERSTANDS/COMMUNICATES WITHOUT DIFFICULTY
RETIRED_EATING: 0-->INDEPENDENT
FALL_HISTORY_WITHIN_LAST_SIX_MONTHS: NO

## 2019-06-02 NOTE — DISCHARGE INSTRUCTIONS
Discharge Instruction for Undelivered Patients      You were seen for: pain assessment  We Consulted: Dr Liz  You had (Test or Medicine):uterine and fetal monitoring, wet prep and UA     Diet:   Drink 8 to 12 glasses of liquids (milk, juice, water) every day.  You may eat meals and snacks.      Activity:  Call your doctor or nurse midwife if your baby is moving less than usual.     Call your provider if you notice:  Swelling in your face or increased swelling in your hands or legs.  Headaches that are not relieved by Tylenol (acetaminophen).  Changes in your vision (blurring: seeing spots or stars.)  Nausea (sick to your stomach) and vomiting (throwing up).   Weight gain of 5 pounds or more per week.  Heartburn that doesn't go away.  Signs of bladder infection: pain when you urinate (use the toilet), need to go more often and more urgently.  The bag of finch (rupture of membranes) breaks, or you notice leaking in your underwear.  Bright red blood in your underwear.  Abdominal (lower belly) or stomach pain.  For first baby: Contractions (tightening) less than 5 minutes apart for one hour or more.  Second (plus) baby: Contractions (tightening) less than 10 minutes apart and getting stronger.  *If less than 34 weeks: Contractions (tightenings) more than 6 times in one hour.  Increase or change in vaginal discharge (note the color and amount)      Follow-up:  As scheduled in the clinic
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
## 2019-06-02 NOTE — PROGRESS NOTES
Data: Patient assessed in the [facility] for uterine contractions.  Cervical exam not examined.  Membranes intact.  Contractions not present.  Action:  Presumed adequate fetal oxygenation documented (see flow record). Discharge instructions reviewed.  Patient instructed to report change in fetal movement, vaginal leaking of fluid or bleeding, abdominal pain, or any concerns related to the pregnancy to her nurse/physician.    Response: Orders to discharge home per Zoraida Liz.  Patient verbalized understanding of education and verbalized agreement with plan. Discharged to home at 0515.

## 2019-06-21 ENCOUNTER — HOSPITAL ENCOUNTER (OUTPATIENT)
Facility: CLINIC | Age: 29
Discharge: HOME | End: 2019-06-22
Attending: OBSTETRICS & GYNECOLOGY | Admitting: OBSTETRICS & GYNECOLOGY
Payer: COMMERCIAL

## 2019-06-21 VITALS — TEMPERATURE: 98.9 F | DIASTOLIC BLOOD PRESSURE: 75 MMHG | SYSTOLIC BLOOD PRESSURE: 115 MMHG | RESPIRATION RATE: 18 BRPM

## 2019-06-21 PROCEDURE — G0463 HOSPITAL OUTPT CLINIC VISIT: HCPCS

## 2019-06-21 RX ORDER — ACETAMINOPHEN 325 MG/1
325-650 TABLET ORAL EVERY 4 HOURS PRN
COMMUNITY
End: 2020-12-07

## 2019-06-22 PROCEDURE — G0463 HOSPITAL OUTPT CLINIC VISIT: HCPCS

## 2019-06-22 NOTE — PLAN OF CARE
AMA form signed by patient per her request.  Patient left for home at 0020 with belongings at side.

## 2019-06-22 NOTE — PROVIDER NOTIFICATION
06/22/19 0017   Provider Notification   Provider Name/Title Dr. CHRIS Sauer   Method of Notification Phone   Request Evaluate - Remote   Notification Reason Status Update;Patient Request   Notified of pt desire to leave AMA.  Reviewed with MD what this RN discussed with pt.  MD verbalized understanding.

## 2019-06-22 NOTE — PROVIDER NOTIFICATION
06/21/19 4480   Provider Notification   Provider Name/Title Dr. CHRIS Sauer   Method of Notification Phone   Request Evaluate - Remote   Notification Reason Patient Arrived   Dr. Odilia Sauer notified of ANDRES pt arrival, gestational age, G&P, pt complaint (see admitting note), FHR AGA, minimal uterine irritability noted, abdominal pain reported as throbbing and tenderness with palpation to left side, although uterus palpates soft, pt O+ blood type per prenatal.  Dr. Sauer verbalized understanding, TORB to monitor for 4 hours and may discharge home if uterine activity remains quiet - if UC's begin to occur, notify MD.  Will update pt on POC and continue to monitor.

## 2019-06-22 NOTE — PLAN OF CARE
Called to pt's room by pt, pt expressed desire to discharge.  Reinforced to pt that MD recommendation was to be monitored for 4 hours to monitor for signs of PTL, placental abruption, fetal distress.  Reviewed s/s of placental abruption and that it can come on quickly.  Pt reports she will return to hospital if she notes any s/s or worsening pain.  Informed pt she may not have time to return to hospital before adverse event for fetus or herself occurs and pt verbalized understanding.  Pt wishes to leave AMA.  Will notify provider.
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
## 2019-10-06 ENCOUNTER — APPOINTMENT (OUTPATIENT)
Dept: GENERAL RADIOLOGY | Facility: CLINIC | Age: 29
End: 2019-10-06
Attending: EMERGENCY MEDICINE
Payer: COMMERCIAL

## 2019-10-06 ENCOUNTER — HOSPITAL ENCOUNTER (EMERGENCY)
Facility: CLINIC | Age: 29
Discharge: HOME | End: 2019-10-06
Attending: EMERGENCY MEDICINE | Admitting: EMERGENCY MEDICINE
Payer: COMMERCIAL

## 2019-10-06 VITALS
HEART RATE: 81 BPM | SYSTOLIC BLOOD PRESSURE: 128 MMHG | RESPIRATION RATE: 18 BRPM | DIASTOLIC BLOOD PRESSURE: 80 MMHG | OXYGEN SATURATION: 99 % | TEMPERATURE: 98.6 F

## 2019-10-06 DIAGNOSIS — R60.0 BILATERAL LOWER EXTREMITY EDEMA: ICD-10-CM

## 2019-10-06 DIAGNOSIS — J06.9 ACUTE URI: ICD-10-CM

## 2019-10-06 LAB
ANION GAP SERPL CALCULATED.3IONS-SCNC: 5 MMOL/L (ref 3–14)
BASOPHILS # BLD AUTO: 0 10E9/L (ref 0–0.2)
BASOPHILS NFR BLD AUTO: 0.5 %
BUN SERPL-MCNC: 11 MG/DL (ref 7–30)
CALCIUM SERPL-MCNC: 8.3 MG/DL (ref 8.5–10.1)
CHLORIDE SERPL-SCNC: 112 MMOL/L (ref 94–109)
CO2 SERPL-SCNC: 25 MMOL/L (ref 20–32)
CREAT SERPL-MCNC: 0.74 MG/DL (ref 0.52–1.04)
DIFFERENTIAL METHOD BLD: ABNORMAL
EOSINOPHIL # BLD AUTO: 0.2 10E9/L (ref 0–0.7)
EOSINOPHIL NFR BLD AUTO: 3 %
ERYTHROCYTE [DISTWIDTH] IN BLOOD BY AUTOMATED COUNT: 13.5 % (ref 10–15)
GFR SERPL CREATININE-BSD FRML MDRD: >90 ML/MIN/{1.73_M2}
GLUCOSE SERPL-MCNC: 94 MG/DL (ref 70–99)
HCT VFR BLD AUTO: 33.2 % (ref 35–47)
HGB BLD-MCNC: 10.1 G/DL (ref 11.7–15.7)
IMM GRANULOCYTES # BLD: 0 10E9/L (ref 0–0.4)
IMM GRANULOCYTES NFR BLD: 0.5 %
LYMPHOCYTES # BLD AUTO: 1.3 10E9/L (ref 0.8–5.3)
LYMPHOCYTES NFR BLD AUTO: 21.1 %
MCH RBC QN AUTO: 27 PG (ref 26.5–33)
MCHC RBC AUTO-ENTMCNC: 30.4 G/DL (ref 31.5–36.5)
MCV RBC AUTO: 89 FL (ref 78–100)
MONOCYTES # BLD AUTO: 0.6 10E9/L (ref 0–1.3)
MONOCYTES NFR BLD AUTO: 9.7 %
NEUTROPHILS # BLD AUTO: 4.2 10E9/L (ref 1.6–8.3)
NEUTROPHILS NFR BLD AUTO: 65.2 %
NRBC # BLD AUTO: 0 10*3/UL
NRBC BLD AUTO-RTO: 0 /100
PLATELET # BLD AUTO: 199 10E9/L (ref 150–450)
POTASSIUM SERPL-SCNC: 3.5 MMOL/L (ref 3.4–5.3)
RBC # BLD AUTO: 3.74 10E12/L (ref 3.8–5.2)
SODIUM SERPL-SCNC: 142 MMOL/L (ref 133–144)
WBC # BLD AUTO: 6.4 10E9/L (ref 4–11)

## 2019-10-06 PROCEDURE — 80048 BASIC METABOLIC PNL TOTAL CA: CPT | Performed by: EMERGENCY MEDICINE

## 2019-10-06 PROCEDURE — 99284 EMERGENCY DEPT VISIT MOD MDM: CPT | Mod: 25

## 2019-10-06 PROCEDURE — 71046 X-RAY EXAM CHEST 2 VIEWS: CPT

## 2019-10-06 PROCEDURE — 85025 COMPLETE CBC W/AUTO DIFF WBC: CPT | Performed by: EMERGENCY MEDICINE

## 2019-10-06 ASSESSMENT — ENCOUNTER SYMPTOMS
SHORTNESS OF BREATH: 1
COUGH: 1
CHILLS: 1

## 2019-10-06 NOTE — ED AVS SNAPSHOT
[facility] Emergency Department  [address]  [facility] [address]  Phone:  [phone]  Fax:  [phone]                                    Ana Enriquez   MRN: [id number]    Department:  [facility] Emergency Department   Date of Visit:  10/6/2019           After Visit Summary Signature Page    I have received my discharge instructions, and my questions have been answered. I have discussed any challenges I see with this plan with the nurse or doctor.    ..........................................................................................................................................  Patient/Patient Representative Signature      ..........................................................................................................................................  Patient Representative Print Name and Relationship to Patient    ..................................................               ................................................  Date                                   Time    ..........................................................................................................................................  Reviewed by Signature/Title    ...................................................              ..............................................  Date                                               Time          22EPIC Rev 08/18

## 2019-10-06 NOTE — DISCHARGE INSTRUCTIONS

## 2019-10-06 NOTE — ED PROVIDER NOTES
History     Chief Complaint:  Leg Swelling    The history is provided by the patient.      Ana Enriquez is a 29 year old female, s/p vaginal delivery 1 week (2019), who presents with bilateral leg swelling. Since patient left the hospital, she has been ill and has had chills, cough with sputum, congestion. She then developed bilateral leg swelling and bilateral hand swelling yesterday (10/5/2019). The swelling worsened today and patient became concerned, prompting her to the ED. Here, patient also has some shortness of breath as well. Infant is doing well at home and is being breast-fed by patient.    Allergies:  Penicillins  Latex     Medications:    The patient is not currently taking any prescribed medications.    Past Medical History:    Abnormal pap smear  Bipolar 1 disorder  Migraine  Muscle spasms of head/neck    Positive for GBS test   HSV-1 infection  BHARTI I  Enlarged thyroid  Abscess of Bartholin's gland  Tobacco use disorder  Eczema  LGSIL  Anxiety  Depression    Past Surgical History:    Colposcopy x3    Family History:    Diabetes - father   Allergies    Social History:  Current every day smoker.  Positive for alcohol use.   Negative for drug use.  Marital Status:  Single.     Review of Systems   Constitutional: Positive for chills.   HENT: Positive for congestion.    Respiratory: Positive for cough and shortness of breath.    Cardiovascular: Positive for leg swelling (bilateral).   All other systems reviewed and are negative.      Physical Exam     Patient Vitals for the past 24 hrs:   BP Temp Temp src Pulse Heart Rate Resp SpO2   10/06/19 0141 115/85 98.6  F (37  C) Oral 81 81 14 98 %     Physical Exam  Constitutional: Alert, attentive  HENT:    Nose: Nose normal.    Mouth/Throat: Oropharynx is clear, mucous membranes are moist   Eyes: EOM are normal.   CV: regular rate and rhythm; no murmurs, rubs or gallups  Chest: Effort normal and breath sounds normal.   GI:  There is no  tenderness. No distension. Normal bowel sounds  MSK: Normal range of motion. Trace bilateral pedal edema, do not appreciate edema to the upper extremities  Neurological: Alert, attentive  Skin: Skin is warm and dry.      Emergency Department Course   Imaging:  Radiographic findings were communicated with the patient who voiced understanding of the findings.    XR Chest 2 Views   Final Result   IMPRESSION: No evidence of active cardiopulmonary disease.      As per radiology.      Laboratory:  CBC: WBC: 6.4, HGB: 10.1 (L), PLT: 199  BMP: Chloride 112 (H), Calcium 8.3 (L), o/w WNL (Creatinine: 0.74)    Emergency Department Course:  0148 Nursing notes and vitals reviewed. I performed an exam of the patient as documented above.     IV inserted. Medicine administered as documented above. Blood drawn. This was sent to the lab for further testing, results above.    The patient was sent for a chest XR while in the emergency department, findings above.     0300 I rechecked the patient and discussed the results of her workup thus far.     Findings and plan explained to the Patient. Patient discharged home with instructions regarding supportive care, medications, and reasons to return. The importance of close follow-up was reviewed.     I personally reviewed the laboratory results with the Patient and answered all related questions prior to discharge.   Impression & Plan    Medical Decision Making:  This is a 29-year-old female status post vaginal delivery 1 week ago who presents for evaluation of acute URI symptoms and for evaluation of lower extremity swelling.  Regarding her URI symptoms, she has a normal cardiopulmonary exam, chest x-ray was performed given 1 week of symptoms and is negative for consolidation and other processes, and she is saturating normal on room air without evidence of bronchospasm.  This is most consistent with viral URI.  I discussed supportive cares for the same.  Regarding her lower extremity  swelling, this may be related to fluid shifting during the postpartum state. I do not appreciate upper extremity swelling.  She has normal renal function and only trace pedal edema bilaterally.  No hypertension to suggest postpartum preeclampsia or other more concerning process.  No clinical evidence of CHF as per above.  Plan continue supportive cares, primary care follow-up in 2 to 3 days, and strict return precautions for difficulty breathing, chest pain, or any other concerns.    Diagnosis:    ICD-10-CM    1. Acute URI J06.9    2. Bilateral lower extremity edema R60.0      Disposition:  discharged to home    Scribe Disposition  I, Yamel Mckeon, am serving as a scribe on 10/6/2019 at 1:54 AM to personally document services performed by Rolf Corbin MD based on my observations and the provider's statements to me.     Yamel Mckeon  10/6/2019   [facility] EMERGENCY DEPARTMENT       Rolf Corbin MD  10/06/19 0404

## 2020-11-07 ENCOUNTER — HOSPITAL ENCOUNTER (EMERGENCY)
Facility: CLINIC | Age: 30
Discharge: HOME | End: 2020-11-07
Attending: EMERGENCY MEDICINE | Admitting: EMERGENCY MEDICINE
Payer: OTHER GOVERNMENT

## 2020-11-07 VITALS
DIASTOLIC BLOOD PRESSURE: 68 MMHG | TEMPERATURE: 97.6 F | SYSTOLIC BLOOD PRESSURE: 116 MMHG | OXYGEN SATURATION: 100 % | WEIGHT: 143.3 LBS | HEIGHT: 60 IN | RESPIRATION RATE: 18 BRPM | HEART RATE: 78 BPM | BODY MASS INDEX: 28.13 KG/M2

## 2020-11-07 DIAGNOSIS — Z20.822 SUSPECTED COVID-19 VIRUS INFECTION: ICD-10-CM

## 2020-11-07 DIAGNOSIS — H66.002 ACUTE SUPPURATIVE OTITIS MEDIA OF LEFT EAR WITHOUT SPONTANEOUS RUPTURE OF TYMPANIC MEMBRANE, RECURRENCE NOT SPECIFIED: ICD-10-CM

## 2020-11-07 LAB
ALBUMIN UR-MCNC: NEGATIVE MG/DL
APPEARANCE UR: CLEAR
BILIRUB UR QL STRIP: NEGATIVE
COLOR UR AUTO: ABNORMAL
GLUCOSE UR STRIP-MCNC: NEGATIVE MG/DL
HGB UR QL STRIP: NEGATIVE
KETONES UR STRIP-MCNC: NEGATIVE MG/DL
LEUKOCYTE ESTERASE UR QL STRIP: NEGATIVE
MUCOUS THREADS #/AREA URNS LPF: PRESENT /LPF
NITRATE UR QL: NEGATIVE
PH UR STRIP: 5.5 PH (ref 5–7)
RBC #/AREA URNS AUTO: <1 /HPF (ref 0–2)
SOURCE: ABNORMAL
SP GR UR STRIP: 1.02 (ref 1–1.03)
SQUAMOUS #/AREA URNS AUTO: <1 /HPF (ref 0–1)
UROBILINOGEN UR STRIP-MCNC: NORMAL MG/DL (ref 0–2)
WBC #/AREA URNS AUTO: <1 /HPF (ref 0–5)

## 2020-11-07 PROCEDURE — U0003 INFECTIOUS AGENT DETECTION BY NUCLEIC ACID (DNA OR RNA); SEVERE ACUTE RESPIRATORY SYNDROME CORONAVIRUS 2 (SARS-COV-2) (CORONAVIRUS DISEASE [COVID-19]), AMPLIFIED PROBE TECHNIQUE, MAKING USE OF HIGH THROUGHPUT TECHNOLOGIES AS DESCRIBED BY CMS-2020-01-R: HCPCS | Performed by: EMERGENCY MEDICINE

## 2020-11-07 PROCEDURE — C9803 HOPD COVID-19 SPEC COLLECT: HCPCS

## 2020-11-07 PROCEDURE — 81001 URINALYSIS AUTO W/SCOPE: CPT | Performed by: EMERGENCY MEDICINE

## 2020-11-07 PROCEDURE — 99283 EMERGENCY DEPT VISIT LOW MDM: CPT

## 2020-11-07 RX ORDER — AZITHROMYCIN 500 MG/1
TABLET, FILM COATED ORAL
Qty: 5 TABLET | Refills: 0 | Status: SHIPPED | OUTPATIENT
Start: 2020-11-07 | End: 2020-11-11

## 2020-11-07 ASSESSMENT — ENCOUNTER SYMPTOMS
DIARRHEA: 1
NAUSEA: 1
MYALGIAS: 1
FEVER: 1
COUGH: 1
SORE THROAT: 1
HEMATURIA: 0
HEADACHES: 1
FREQUENCY: 0
DYSURIA: 0

## 2020-11-07 ASSESSMENT — MIFFLIN-ST. JEOR: SCORE: 1286.5

## 2020-11-07 NOTE — LETTER
November 7, 2020      To Whom It May Concern:      Ana Enriquez was seen in our Emergency Department today, 11/07/20. Patient was swabbed for Covid-19. If test is negative, patient can return to work. If test is positive, pt can return back to work on 11/16 and symptom free for >24hours.     Sincerely,        Yamel Cardozo RN

## 2020-11-07 NOTE — ED TRIAGE NOTES
Pt arrives with multiple symptoms since yesterday including sore throat, fever 102.4 at home, diarrhea, cough, body aches, and L ear pain. ABCs intact.     Last ibuprofen 1 hour PTA.

## 2020-11-07 NOTE — ED PROVIDER NOTES
History     Chief Complaint:  Multiple Complaints      HPI   Ana Enriquez is a 30 year old female smoker who presents with multiple complaints. The patient reported that yesterday she was febrile with a temperature of 102.4  F and had left ear pain, headache, cough, sore throat, body aches, nausea, and diarrhea. Additionally she endorses increased urinary urgency but otherwise denies any urinary complaints. The patient works at  [employer] and found out that a coworker tested positive for Covid-19. Her daughter was recently tested as well however she was negative (though did test positive for strep pharyngitis). For symptom management she has been taking ibuprofen, the last dose of which was approximately 1 hour PTA. She denied any chance of pregnancy.    Allergies:  Penicillins  Latex     Medications:    The patient is currently on no regular medications.     Past Medical History:    Bipolar 1 disorder   Migraine     Past Surgical History:    Colposcopy     Family History:    No past pertinent family history.    Social History:  Tobacco use: Current every day smoker   Alcohol use: No  Drug use: No  PCP: Tania Schofield  Marital Status:  Single [1]     Review of Systems   Constitutional: Positive for fever.   HENT: Positive for ear pain and sore throat.    Respiratory: Positive for cough.    Gastrointestinal: Positive for diarrhea and nausea.   Genitourinary: Positive for urgency. Negative for dysuria, frequency and hematuria.   Musculoskeletal: Positive for myalgias.   Neurological: Positive for headaches.   All other systems reviewed and are negative.      Physical Exam     Patient Vitals for the past 24 hrs:   BP Temp Temp src Pulse Resp SpO2 Height Weight   11/07/20 0941 116/68 97.6  F (36.4  C) Oral 78 18 100 % 1.524 m (5') 65 kg (143 lb 4.8 oz)       Physical Exam  General:              Well-nourished              Speaking in full sentences   Resting comfortably on gurney  Eyes:              Conjunctiva  without injection or scleral icterus              PERRL              EOM full w/out entrapment or proptosis  ENT:              Moist mucous membranes              Posterior oropharynx clear without erythema or exudate              No tonsillar hypertrophy, exudate, asymmetry, nor uvular deviation              No oral lesions              L TM with purulent effusion, obscuration of landmarks, blunted light reflex              R TM translucent and gray              Nares patent              Pinnae normal              No midface swelling, erythema, or asymmetry  Neck:              Full ROM              No stiffness appreciated  Resp:              Lungs CTAB              No crackles, wheezing or audible rubs              Good air movement  CV:                    Normal rate, regular rhythm              S1 and S2 present              No murmur, gallop or rub  GI:              BS present              Abdomen soft without distention              Non-tender to light and deep palpation              No guarding or rebound tenderness  Skin:              Warm, dry, well perfused              No rashes or open wounds on exposed skin  MSK:              Moves all extremities              No focal deformities or swelling  Neuro:              Alert              Answers questions appropriately              Moves all extremities equally              Gait stable  Psych:              Normal affect, normal mood    Emergency Department Course   Laboratory:  Symptomatic COVID-19 Virus PCR Nasopharyngeal swab: pending     UA: Mucous: Present, o/w Negative    Emergency Department Course:  Nursing notes and vitals reviewed. (1013) I performed an exam of the patient as documented above.     IV inserted. Medicine administered as documented above. Covid-19 swab collected.  Urine sample collected. This was sent to the lab for further testing, results above.    (3068) I rechecked the patient and discussed the results of her workup thus far.      Findings and plan explained to the Patient. Patient discharged home with instructions regarding supportive care, medications, and reasons to return. The importance of close follow-up was reviewed. The patient was prescribed Azithromycin.     I personally reviewed the laboratory results with the Patient and answered all related questions prior to discharge.     Impression & Plan    Covid-19  Ana Enriquez was evaluated during a global COVID-19 pandemic, which necessitated consideration that the patient might be at risk for infection with the SARS-CoV-2 virus that causes COVID-19.   Applicable protocols for evaluation were followed during the patient's care.   COVID-19 was considered as part of the patient's evaluation. The plan for testing is:  a test was obtained during this visit.    Medical Decision Making:  Ana Enriquez is a 30-year-old female presenting to the ED for multiple complaints including fever, left ear pain, cough, sore throat, body aches, diarrhea, and urinary urgency.  VS on presentation as noted above.  Clinical exam reveals evidence of acute otitis media to the left side.  No current evidence of otitis externa, or mastoiditis.  She does have mild pharyngeal irritation.  We discussed testing for strep given patient's positive exposure (daughter), though as patient will be treated with antibiotics for left otitis media, she is deferring testing, which I feel to be reasonable.  COVID-19 remains high on the differential given her above symptom complex.  Testing was obtained from the ED today.  She understands of the need to maintain isolation while awaiting test results.  She also describes urinary urgency, though UA not consistent with infection.  Given her clinical well appearance, unremarkable vital signs, patient is felt appropriate for discharge from the ED with supportive outpatient treatment and close follow-up. Given PCN allergy, will begin Azithromycin (500 mg daily; treatment dose for  possible strep, though will also cover AOM). Return to ED with any new or troubling symptoms such as worsened cough, shortness of breath, ear pain, fevers, or any other concerns.  Questions answered prior to discharge.      Diagnosis:    ICD-10-CM    1. Acute suppurative otitis media of left ear without spontaneous rupture of tympanic membrane, recurrence not specified  H66.002 UA with Microscopic     Symptomatic COVID-19 Virus (Coronavirus) by PCR   2. Suspected COVID-19 virus infection  Z20.828        Disposition:  discharged to home    Discharge Medications:  Discharge Medication List as of 11/7/2020 11:19 AM      START taking these medications    Details   azithromycin (ZITHROMAX) 500 MG tablet One tab daily by mouth, Disp-5 tablet, R-0, Local Print           Scribe Disclosure:  Yesi TREVINO, am serving as a scribe on 11/7/2020 at 10:13 AM to personally document services performed by Bert Adames MD based on my observations and the provider's statements to me.     Yesi Bradford  11/7/2020   [facility] EMERGENCY DEPT       Bert Adames MD  11/07/20 4000

## 2020-11-07 NOTE — ED AVS SNAPSHOT
[facility] Emergency Dept  [address]  [facility] [address]  Phone: [phone]  Fax: [phone]                                    Ana Enriquez   MRN: [id number]    Department: [facility] Emergency Dept   Date of Visit: 11/7/2020           After Visit Summary Signature Page    I have received my discharge instructions, and my questions have been answered. I have discussed any challenges I see with this plan with the nurse or doctor.    ..........................................................................................................................................  Patient/Patient Representative Signature      ..........................................................................................................................................  Patient Representative Print Name and Relationship to Patient    ..................................................               ................................................  Date                                   Time    ..........................................................................................................................................  Reviewed by Signature/Title    ...................................................              ..............................................  Date                                               Time          22EPIC Rev 08/18

## 2020-11-07 NOTE — DISCHARGE INSTRUCTIONS
"Discharge Instructions  Otitis Media  You or your child have an ear infection known as otitis media or middle ear infection (otitis = ear, media = middle). These infections often develop after a viral infection, such as a cold. The cold causes swelling around the pressure-equalizing tube of the ear, which allows fluid to build up in the space behind the eardrum (the middle ear). This fluid build-up can trap bacteria and viruses and increase pressure on the eardrum causing pain. Symptoms of an ear infection can include earache/pain and decreased hearing loss. These symptoms often come on suddenly. For children, symptoms may include fever (temperature >100.4 F), pulling on the ear, fussiness, and decreased activity/appetite.  Generally, every Emergency Department visit should have a follow-up clinic visit with either a primary or a specialty clinic/provider. Please follow-up as instructed by your emergency provider today.    Return to the Emergency Department if:  Your child becomes very fussy or weak.  The symptoms get worse, or if you develop a severe headache, stiff neck, or new symptoms.    Treatment:  The \"best\" treatment depends on your age, history of previous infections, and any underlying medical problems.  Antibiotics are not given to every patient with an ear infection because studies show that many people with ear infections will improve without using antibiotics. Because antibiotics can have side effects such as diarrhea and stomach upset and can also cause severe allergic reactions, providers are trying to avoid using antibiotics if it is safe for the patient to do so.   In these cases, a prescription for antibiotics may be given to be filled in 24 -48 hours if symptoms are getting worse or not improving (this is often called  wait and see  treatment). If the symptoms are improving, the antibiotic does not need to be taken.   Remember, antibiotics do not treat pain.    Pain medications. You may take a " pain medication such as Tylenol  (acetaminophen), Advil  (ibuprofen), Nuprin  (ibuprofen) or Aleve  (naproxen).    Complications:    Tympanic membrane rupture - One possible complication of an ear infection is rupture of the tympanic membrane, or ear drum. This happens because of pressure on the tympanic membrane from the infected fluid. When the tympanic membrane ruptures, you may have pus or blood drain from the ear. It does not hurt when the membrane ruptures, and many people actually feel better because pressure is released. Fortunately, the tympanic membrane usually heals quickly after rupturing, within hours to days. You should keep water out of the ear until you re-check with your provider to be sure the ear drum has healed.     Mastoiditis - Rarely, the area behind the ear can become infected, this area is called the mastoid.  If you notice redness and swelling behind your ear, see your provider or return to the Emergency Department immediately.      Hearing loss - The fluid that collects behind the eardrum (called an effusion) can persist for weeks to months after the pain of an ear infection resolves. An effusion causes trouble hearing, which is usually temporary. If the fluid persists, however, it can interfere with the process of learning to speak.   For this reason, children under 2 need to be seen by their pediatrician WITHIN 3 MONTHS to ensure that the fluid has resolved.  If you were given a prescription for medicine here today, be sure to read all of the information (including the package insert) that comes with your prescription.  This will include important information about the medicine, its side effects, and any warnings that you need to know about.  The pharmacist who fills the prescription can provide more information and answer questions you may have about the medicine.  If you have questions or concerns that the pharmacist cannot address, please call or return to the Emergency Department.    Remember that you can always come back to the Emergency Department if you are not able to see your regular provider in the amount of time listed above, if you get any new symptoms, or if there is anything that worries you.  Discharge Instructions  COVID-19    COVID-19 is the disease caused by a new coronavirus. The virus spreads from person-to-person primarily by droplets when an infected person coughs or sneezes and the droplet either lands on another person or that other person touches a surface with the droplet on it. There are tests available to diagnose COVID-19. There is no specific treatment or medicine for the disease.    You may have been diagnosed with COVID, may be being tested for COVID and have a pending test result, or may have been exposed to COVID.    Symptoms of COVID-19  Many people have no symptoms or mild symptoms.  Symptoms may usually appear 4 to 5 days (up to 14 days) after contact with a person with COVID-19. Some people will get severe symptoms and pneumonia. Usual symptoms are:     ? Fever  ? Cough  ? Trouble breathing    Less common symptoms are: Headache, body aches, sore throat, sneezing, diarrhea,loss of taste or smell.    Isolation and Quarantine    You were seen because you have symptoms, had an exposure, or had some other concern about possible COVID. The best way to stop the spread of the virus is to avoid contact with others.  Isolation refers to sick people staying away from people who are not sick. A person in quarantine is limiting activity because they were exposed and are waiting to see if they might become sick.    If you test positive for COVID, you should stay home (isolation) for at least 10 days after your symptoms began, and for 24 hours with no fever and improvement of symptoms--whichever is longer. (Your fever should be gone for 24 hours without using fever-reducing medicine). If you have no symptoms, you should stay home (isolation) for 10 days from the day of the  test.    For example, if you have a fever and cough for 6 days, you need to stay home 4 more days with no fever for a total of 10 days. Or, if you have a fever and cough for 10 days, you need to stay home one more day with no fever for a total of 11 days.    If you have a high-risk exposure to COVID (you spent 15 minutes or more within six feet of somebody who has COVID), you should stay home (quarantine) for 14 days. Even if you test negative for COVID, the CDC recommends a 14-day quarantine from the time of your last exposure to that individual.    If you have symptoms but a negative test, you should stay at home until you are symptom-free and without fever for 24 hours, using the same judgment you would for when it is safe to return to work/school from strep throat, influenza, or the common cold. If you worsen, you should consider being re-evaluated.    If you are being tested for COVID and your test is pending, you should stay home until you know your test result.    How should I protect myself and others?    Do not go to work or school. Have a friend or relative do your shopping. Do not use public transportation (bus, train) or ridesharing (Lyft, Uber).    Separate yourself from other people in your home.?As much as possible, you should stay in one room and away from other people in your home. Also, use a separate bathroom, if possible. Avoid handling pets or other animals while sick.     Wear a facemask if you need to be around other people and cover your mouth and nose with a tissue when you cough or sneeze.     Avoid sharing personal household items. You should not share dishes, drinking glasses, forks/knives/spoons, towels, or bedding with other people in your home. After using these items, they should be washed with soap and water. Clean parts of your home that are touched often (doorknobs, faucets, countertops, etc.) daily.     Wash your hands often with soap and water for at least 20 seconds or use an  alcohol-based hand  containing at least 60% alcohol.     Avoid touching your face.    Treat your symptoms. You can take Acetaminophen (Tylenol) to treat body aches and fever as needed for comfort. Ibuprofen (Advil or Motrin) can be used as well if you still have symptoms after taking Tylenol. Drink fluids. Rest.    Watch for worsening symptoms such as shortness of breath/difficulty breathing or very severe weakness.    Employers/workplaces are being asked by the Centers for Disease Control (CDC) to not request notes/documentation for you to return to work or prove that you were ill. You may choose to show your employer this paperwork. Also, repeat testing should not be required to return to work.    Return to the Emergency Department if:    If you are developing worsening breathing, shortness of breath, or feel worse you should seek medical attention.  If you are uncertain, contact your health care provider/clinic. If you need emergency medical attention, call 911 and tell them you have been ill.

## 2020-11-09 LAB
SARS-COV-2 RNA SPEC QL NAA+PROBE: NOT DETECTED
SPECIMEN SOURCE: NORMAL

## 2020-11-24 ENCOUNTER — NURSE TRIAGE (OUTPATIENT)
Dept: NURSING | Facility: CLINIC | Age: 30
End: 2020-11-24

## 2020-11-25 ENCOUNTER — VIRTUAL VISIT (OUTPATIENT)
Dept: URGENT CARE | Facility: CLINIC | Age: 30
End: 2020-11-25

## 2020-11-25 DIAGNOSIS — H92.02 LEFT EAR PAIN: Primary | ICD-10-CM

## 2020-11-25 PROCEDURE — 99203 OFFICE O/P NEW LOW 30 MIN: CPT | Mod: TEL | Performed by: NURSE PRACTITIONER

## 2020-11-25 NOTE — TELEPHONE ENCOUNTER
Triage Call:    Patient was seen in the ER on 11/7/2020 for fever of 102.4  F and had left ear pain, headache, cough, sore throat, body aches, nausea, and diarrhea. Patient was also reporting urinary urgency at the time, but currently patient is not having any urinary problems. Patient was tested for COVID-19 and results came back negative.     Patient was prescribed azithromycin (ZITHROMAX) 500 MG tablet to treat possible ear infection that  In the ED told patient would also help with patients sore throat.    Patient states that her sx improved slightly after taking the antibiotic, but states her sx are now back.     Patient is currently having:    -L. Ear pain  -L. Ear discharge that is green  -Mild Sore throat    Patient denies any breathing difficulties currently now, but states every once and awhile she will have mild SOB with activity. Patient states this is intermittent.    Patient denies any chest pain, issues with swallowing, fever, inability to open mouth, cough, nausea, vomiting, blurred vision, severe ear pain, walking unsteady, stiff neck, rash, injury to the ear cancel, swelling inside and outside of ear.     Per protocol, recommendations are for patient to be seen by PCP within 24 hours. Patients PCP is through [facility]. Patient would like do have a visit through  "[employer] . RN advised since patient is having COVID like sx that virtual visit is recommended. RN transferred patient to scheduling and got patient scheduled [place] [facility] [place]. RN advised to patient to call back with any new or worsening sx. Patient verbalized understanding and agrees with plan.     Jerrica Guido RN, BSN Nurse Triage Advisor 8:02 PM 11/24/2020       Reason for Disposition    [1] COVID-19 infection suspected by caller or triager AND [2] mild symptoms (cough, fever, or others) AND [3] no complications or SOB    Additional Information    Negative: [1] Recently diagnosed with otitis media AND [2]  currently on oral antibiotics    Negative: Followed an ear injury    Negative: Foreign body struck in the ear (e.g., bug, piece of cotton)    Negative: Moving the earlobe or touching the ear clearly increases the pain    Negative: [1] Stiff neck (unable to touch chin to chest) AND [2] fever    Negative: [1] Bony area of skull behind the ear is pink or swollen AND [2] fever    Negative: Fever > 104 F (40 C)    Negative: Patient sounds very sick or weak to the triager    Negative: New blurred vision or vision changes    Negative: Diabetes mellitus or weak immune system (e.g., HIV positive, cancer chemo, splenectomy, organ transplant, chronic steroids)    Negative: Sudden onset of ear pain after long - thin object was inserted into the ear canal (e.g., pencil, Q-tip)    Negative: [1] SEVERE pain AND [2] not improved 2 hours after taking analgesic medication (e.g., ibuprofen or acetaminophen)    Negative: Walking is very unsteady    White, yellow, or green discharge     Discharge from the [facility]    Negative: [1] Diagnosed strep throat AND [2] taking antibiotic AND [3] symptoms continue    Negative: Throat culture results, call about    Negative: Productive cough is main symptom    Negative: Non-productive cough is main symptom    Negative: Hoarseness is main symptom    Negative: Runny nose is main symptom    Negative: [1] Drooling or spitting out saliva (because can't swallow) AND [2] normal breathing    Negative: Unable to open mouth completely    Negative: [1] Difficulty breathing AND [2] not severe    Negative: Fever > 104 F (40 C)    Negative: [1] Refuses to drink anything AND [2] for > 12 hours    Negative: [1] Drinking very little AND [2] dehydration suspected (e.g., no urine > 12 hours, very dry mouth, very lightheaded)    Negative: Patient sounds very sick or weak to the triager    Negative: SEVERE (e.g., excruciating) throat pain    Negative: [1] Pus on tonsils (back of throat) AND [2]  fever AND [3] swollen  "neck lymph nodes (\"glands\")    Negative: [1] Rash AND [2] widespread (especially chest and abdomen)    Earache also present    Negative: Fever present > 3 days (72 hours)    Negative: [1] Fever returns after gone for over 24 hours AND [2] symptoms worse or not improved    Negative: [1] Continuous (nonstop) coughing interferes with work or school AND [2] no improvement using cough treatment per protocol    Negative: MILD difficulty breathing (e.g., minimal/no SOB at rest, SOB with walking, pulse <100)     Not currently SOB.    Negative: Chest pain or pressure    Negative: Fever > 103 F (39.4 C)    Negative: [1] Fever > 101 F (38.3 C) AND [2] age > 60    Negative: [1] Fever > 100.0 F (37.8 C) AND [2] bedridden (e.g., nursing home patient, CVA, chronic illness, recovering from surgery)    Negative: HIGH RISK patient (e.g., age > 64 years, diabetes, heart or lung disease, weak immune system) (Exception: Has already been evaluated by healthcare provider and has no new or worsening symptoms)    Negative: Patient sounds very sick or weak to the triager    Negative: SEVERE or constant chest pain or pressure (Exception: mild central chest pain, present only when coughing)    Negative: MODERATE difficulty breathing (e.g., speaks in phrases, SOB even at rest, pulse 100-120)    Negative: SEVERE difficulty breathing (e.g., struggling for each breath, speaks in single words)    Negative: Difficult to awaken or acting confused (e.g., disoriented, slurred speech)    Negative: Bluish (or gray) lips or face now    Negative: Shock suspected (e.g., cold/pale/clammy skin, too weak to stand, low BP, rapid pulse)    Negative: Sounds like a life-threatening emergency to the triager    Negative: [1] COVID-19 exposure AND [2] no symptoms    Negative: COVID-19 and Breastfeeding, questions about    Negative: [1] Adult with possible COVID-19 symptoms AND [2] triager concerned about severity of symptoms or other causes    Negative: Severe " difficulty breathing (e.g., struggling for each breath, speaks in single words, stridor)    Negative: Sounds like a life-threatening emergency to the triager    Protocols used: CORONAVIRUS (COVID-19) DIAGNOSED OR [id number] 8.4.20, EARACHE-A-AH, SORE THROAT-A-AH    COVID 19 Nurse Triage Plan/Patient Instructions    Please be aware that novel coronavirus (COVID-19) may be circulating in the community. If you develop symptoms such as fever, cough, or SOB or if you have concerns about the presence of another infection including coronavirus (COVID-19), please contact your health care provider or visit [email or web address].     Disposition/Instructions    Virtual Visit with provider recommended. Reference Visit Selection Guide.    Thank you for taking steps to prevent the spread of this virus.  o Limit your contact with others.  o Wear a simple mask to cover your cough.  o Wash your hands well and often.    Resources    [facility]: About COVID-19: [email or web address]    CDC: What to Do If You're Sick: www.cdc.gov/coronavirus/2019-ncov/about/steps-when-sick.html    CDC: Ending Home Isolation: www.cdc.gov/coronavirus/2019-ncov/hcp/disposition-in-home-patients.html     CDC: Caring for Someone: www.cdc.gov/coronavirus/2019-ncov/if-you-are-sick/care-for-someone.html     [facility]: Interim Guidance for Hospital Discharge to Home: www.health.[facility].[place]/diseases/coronavirus/hcp/hospdischarge.pdf    [facility] clinical trials (COVID-19 research studies): clinicalaffairs.[facility]/umn-clinical-trials     Below are the COVID-19 hotlines at the [place] Department of Health ([facility]). Interpreters are available.   o For health questions: Call 985-264-6975 or 1-729.244.4647 (7 a.m. to 7 p.m.)  o For questions about schools and childcare: Call 844-234-7197 or 1-645.540.3395 (7 a.m. to 7 p.m.)
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
## 2020-12-06 ENCOUNTER — HOSPITAL ENCOUNTER (EMERGENCY)
Facility: CLINIC | Age: 30
Discharge: HOME | End: 2020-12-06
Attending: NURSE PRACTITIONER | Admitting: NURSE PRACTITIONER

## 2020-12-06 VITALS
HEART RATE: 68 BPM | DIASTOLIC BLOOD PRESSURE: 77 MMHG | SYSTOLIC BLOOD PRESSURE: 122 MMHG | RESPIRATION RATE: 20 BRPM | TEMPERATURE: 99.1 F | OXYGEN SATURATION: 100 % | WEIGHT: 160 LBS | BODY MASS INDEX: 31.25 KG/M2

## 2020-12-06 DIAGNOSIS — Z20.822 ENCOUNTER FOR LABORATORY TESTING FOR COVID-19 VIRUS: ICD-10-CM

## 2020-12-06 DIAGNOSIS — J02.9 SORE THROAT: ICD-10-CM

## 2020-12-06 DIAGNOSIS — H60.92 OTITIS EXTERNA, LEFT: ICD-10-CM

## 2020-12-06 LAB
DEPRECATED S PYO AG THROAT QL EIA: NEGATIVE
SPECIMEN SOURCE: NORMAL

## 2020-12-06 PROCEDURE — 87651 STREP A DNA AMP PROBE: CPT | Performed by: PHYSICIAN ASSISTANT

## 2020-12-06 PROCEDURE — 99283 EMERGENCY DEPT VISIT LOW MDM: CPT

## 2020-12-06 PROCEDURE — C9803 HOPD COVID-19 SPEC COLLECT: HCPCS

## 2020-12-06 PROCEDURE — 999N001174 HC STATISTIC STREP A RAPID: Performed by: PHYSICIAN ASSISTANT

## 2020-12-06 PROCEDURE — U0003 INFECTIOUS AGENT DETECTION BY NUCLEIC ACID (DNA OR RNA); SEVERE ACUTE RESPIRATORY SYNDROME CORONAVIRUS 2 (SARS-COV-2) (CORONAVIRUS DISEASE [COVID-19]), AMPLIFIED PROBE TECHNIQUE, MAKING USE OF HIGH THROUGHPUT TECHNOLOGIES AS DESCRIBED BY CMS-2020-01-R: HCPCS | Performed by: PHYSICIAN ASSISTANT

## 2020-12-06 RX ORDER — CIPROFLOXACIN 500 MG/1
500 TABLET, FILM COATED ORAL 2 TIMES DAILY
Qty: 14 TABLET | Refills: 0 | Status: SHIPPED | OUTPATIENT
Start: 2020-12-06 | End: 2020-12-13

## 2020-12-06 RX ORDER — NEOMYCIN SULFATE, POLYMYXIN B SULFATE, HYDROCORTISONE 3.5; 10000; 1 MG/ML; [USP'U]/ML; MG/ML
3 SOLUTION/ DROPS AURICULAR (OTIC) 4 TIMES DAILY
Qty: 5 ML | Refills: 0 | Status: SHIPPED | OUTPATIENT
Start: 2020-12-06 | End: 2020-12-13

## 2020-12-06 ASSESSMENT — ENCOUNTER SYMPTOMS
CHILLS: 1
FEVER: 1
HEADACHES: 1

## 2020-12-06 NOTE — LETTER
December 6, 2020      To Whom It May Concern:      Ana Enriquez was seen in our Emergency Department today, 12/06/20.  Please excuse her from work today 12/6/20. Ana must isolate until she has a negative COVID-19 test.    Sincerely,        Ana Cano PA-C

## 2020-12-06 NOTE — ED AVS SNAPSHOT
[facility] Emergency Dept  [address]  [facility] [address]  Phone: [phone]  Fax: [phone]                                    Ana Enriquez   MRN: [id number]    Department: [facility] Emergency Dept   Date of Visit: 12/6/2020           After Visit Summary Signature Page    I have received my discharge instructions, and my questions have been answered. I have discussed any challenges I see with this plan with the nurse or doctor.    ..........................................................................................................................................  Patient/Patient Representative Signature      ..........................................................................................................................................  Patient Representative Print Name and Relationship to Patient    ..................................................               ................................................  Date                                   Time    ..........................................................................................................................................  Reviewed by Signature/Title    ...................................................              ..............................................  Date                                               Time          22EPIC Rev 08/18

## 2020-12-07 ENCOUNTER — APPOINTMENT (OUTPATIENT)
Dept: GENERAL RADIOLOGY | Facility: CLINIC | Age: 30
End: 2020-12-07
Attending: EMERGENCY MEDICINE
Payer: COMMERCIAL

## 2020-12-07 ENCOUNTER — HOSPITAL ENCOUNTER (EMERGENCY)
Facility: CLINIC | Age: 30
Discharge: HOME | End: 2020-12-07
Attending: EMERGENCY MEDICINE | Admitting: EMERGENCY MEDICINE
Payer: COMMERCIAL

## 2020-12-07 VITALS
DIASTOLIC BLOOD PRESSURE: 79 MMHG | WEIGHT: 160 LBS | OXYGEN SATURATION: 100 % | HEART RATE: 76 BPM | SYSTOLIC BLOOD PRESSURE: 101 MMHG | RESPIRATION RATE: 18 BRPM | BODY MASS INDEX: 31.41 KG/M2 | HEIGHT: 60 IN | TEMPERATURE: 98.2 F

## 2020-12-07 DIAGNOSIS — T74.21XA SEXUAL ASSAULT OF ADULT, INITIAL ENCOUNTER: ICD-10-CM

## 2020-12-07 LAB
SARS-COV-2 RNA SPEC QL NAA+PROBE: NOT DETECTED
SPECIMEN SOURCE: NORMAL
SPECIMEN SOURCE: NORMAL
STREP GROUP A PCR: NOT DETECTED

## 2020-12-07 PROCEDURE — 71046 X-RAY EXAM CHEST 2 VIEWS: CPT

## 2020-12-07 PROCEDURE — 73080 X-RAY EXAM OF ELBOW: CPT | Mod: LT

## 2020-12-07 PROCEDURE — 99285 EMERGENCY DEPT VISIT HI MDM: CPT | Mod: 25

## 2020-12-07 PROCEDURE — 73610 X-RAY EXAM OF ANKLE: CPT | Mod: RT

## 2020-12-07 PROCEDURE — 250N000013 HC RX MED GY IP 250 OP 250 PS 637: Performed by: EMERGENCY MEDICINE

## 2020-12-07 RX ORDER — ACETAMINOPHEN 500 MG
1000 TABLET ORAL ONCE
Status: COMPLETED | OUTPATIENT
Start: 2020-12-07 | End: 2020-12-07

## 2020-12-07 RX ADMIN — ACETAMINOPHEN 1000 MG: 500 TABLET, FILM COATED ORAL at 13:48

## 2020-12-07 ASSESSMENT — MIFFLIN-ST. JEOR: SCORE: 1362.26

## 2020-12-07 ASSESSMENT — ENCOUNTER SYMPTOMS: ARTHRALGIAS: 1

## 2020-12-07 NOTE — ED TRIAGE NOTES
Pt arrives to the ED due to sore throat, ear pain and headache that has been present over past week. Pt states she was seen for the sore throat and ear pain and started on antibiotics but they did not help. Pt tested negative for COVID. Low grade fevers at home.

## 2020-12-07 NOTE — ED PROVIDER NOTES
Emergency Department Attending Supervision Note  12/6/2020  6:54 PM      I evaluated this patient in conjunction with Ana SWIFT    Briefly, the patient presented with left ear pain and sore throat.  She has had symptoms for approximately one month.  Has already tried one course of ABX without relief.  Because of the continued dis comfort she presented today for evaluation.  Recent negative Covid test.      On my exam:  .General: Alert, Mild  discomfort, well kept   HENT:  Normal voice, No lymphadenopathy, left external canal edema and tenderness with movement of pinna and tragus, TM's normal bilaterally   Eyes:  The pupils are equal, round, and reactive to light, Conjunctiva normal, No scleral icterus   Neck:  Normal range of motion  CV:  Normal Pulses  Resp:  Non-labored, No cough  MS:  Normal muscular tone, moves all extremities  Skin:  No rash or acute skin lesions noted  Neuro:  Speech is normal and fluent  Psych: Awake. Alert.  Normal affect.  Appropriate interactions. Good eye contact        Diagnosis    ICD-10-CM    1. Otitis externa, left  H60.92 Streptococcus A Rapid Scr w Reflx to PCR     Symptomatic COVID-19 Virus (Coronavirus) by PCR     Group A Streptococcus PCR Throat Swab     Group A Streptococcus PCR Throat Swab   2. Sore throat  J02.9    3. Encounter for laboratory testing for COVID-19 virus  Z20.828          ANGÉLICA Weiss CNPCNP 12/6/2020 6:54 PM     Erick Lovell APRN CNP  12/06/20 2057       Erick Lovell APRN CNP  12/06/20 7002

## 2020-12-07 NOTE — DISCHARGE INSTRUCTIONS
Come back to the emergency room immediately with any other concerns.  The ER is always open if you need care.

## 2020-12-07 NOTE — ED PROVIDER NOTES
"  History     Chief Complaint:  Ear pain  Headache   Sore throat      The history is provided by the patient.      Ana Enriquez is a 30 year old female who presents to the ED for the evaluation of a headache, sore throat, and left ear pain beginning \"weeks ago\". The patient  contacted the nurse triage line 11/24/2020 and was directed to Urgent care 11/25/2020 in order for follow up on continued left ear pain.  The patient was prescribed Azithromycin and took it as prescribed with   no relief. Upon arrival to the ER today she complains of left ear pain, fever (as high as 102 yesterday), sore throat and chills. The patient states the prescribed medication gave no relief and she has taken tylenol and ibuprofen that she believes has worsened her symptoms. The patient has no Covid exposures since her last negative test. At the time of the exam, the patient denied head trauma, visual changes, neck stiffness, or any other medical concerns.     Laboratory: 11/07/2020  COVID-19 Virus PCR: Negative      Allergies:  Penicillins  Latex       Medications:    Acetaminophen  Prenatal vit-fe fumarate FA    Past Medical History:    Bipolar 1  Migraine  Muscle spasms of head and/or neck  Wounds and injuries      Past Surgical History:    Colposcopy      Family History:    History reviewed. No pertinent family history.     Social History:  Smoking status: Everyday  Alcohol use: Not currently  Drug use: No  PCP: Tania Schofield  Marital Status:  Single [1]       Review of Systems   Constitutional: Positive for chills and fever.   HENT: Positive for ear pain.    Neurological: Positive for headaches.   All other systems reviewed and are negative.    Physical Exam     Patient Vitals for the past 24 hrs:   BP Temp Temp src Pulse Resp SpO2 Weight   12/06/20 1808 122/77 99.1  F (37.3  C) Oral 68 20 100 % 72.6 kg (160 lb)       Physical Exam  Vitals signs and nursing note reviewed.   HENT:      Nose: Nose normal. No congestion or " rhinorrhea. Normal voice.      Mouth/Throat:      Mouth: Mucous membranes are moist.      Pharynx: Oropharynx is clear. No oropharyngeal exudate. Mild posterior oropharyngeal erythema. Uvula midline. No tonsillar hypertrophy.  Ear: left ear: Tenderness with tragal pressure and when the auricle is manipulated. Right ear: normal.   Eyes:      General: No scleral icterus.     Extraocular Movements: Extraocular movements intact.      Conjunctiva/sclera: Conjunctivae normal.      Pupils: Pupils are equal, round, and reactive to light.   Cardiovascular:      Rate and Rhythm: Regular rhythm. Normal Rate.     Pulses: Normal pulses.      Heart sounds: Normal heart sounds.   Pulmonary:      Effort: Pulmonary effort is normal.      Breath sounds: Normal breath sounds.   Abdominal:      General: Abdomen is flat. Bowel sounds are normal.      Palpations: Abdomen is soft.      Tenderness: There is no abdominal tenderness.   Musculoskeletal: Normal range of motion.      Right lower leg: No edema.      Left lower leg: No edema.   Skin:     General: Skin is warm and dry.   Neurological:      Mental Status: Responds appropriately to questions.  Psychiatric:         Mood and Affect: Mood normal.         Behavior: Behavior normal.     Emergency Department Course     Laboratory:  Laboratory findings were communicated with the patient who voiced understanding of the findings.    COVID-19 Virus PCR: Pending    Rapid Strep Test: negative  Strep Culture: Pending     Emergency Department Course:  Past medical records, nursing notes, and vitals reviewed.    1912 I performed an exam of the patient as documented above.        2015 I rechecked the patient and discussed the results of her workup thus far.     Findings and plan explained to the Patient. Patient discharged home with instructions regarding supportive care, medications, and reasons to return. The importance of close follow-up was reviewed.     I personally reviewed the laboratory  results with the Patient and answered all related questions prior to discharge.     Impression & Plan   Covid-19  Ana Enriquez was evaluated during a global COVID-19 pandemic, which necessitated consideration that the patient might be at risk for infection with the SARS-CoV-2 virus that causes COVID-19.   Applicable protocols for evaluation were followed during the patient's care.   COVID-19 was considered as part of the patient's evaluation. The plan for testing is:  a test was obtained during this visit.      Medical Decision Making:  Ana Enriquez is a 30 year old female who presents for evaluation of ear pain, sore throat, and mild headache. See HPI for details. Strep negative. Covid swab sent-pending.  On physical exam, the patient had tenderness with tragal pressure and when the left auricle is manipulated consistent with otitis externa. There was no evidence of malignant otitis or mastoiditis.  The patient will be started on Cortisporin drops and ciprofloxacin PO given the duration of her symptoms. She was discharged home in stable condition with instructions to follow up with her primary care provider in 2-3 days if no improvement. Advised for immediate return to ER if he develops high fever (not controled with medications), increased pain, or for other concerns. All questions and concerns addressed prior to discharge.    Diagnosis:    ICD-10-CM    1. Otitis externa, left  H60.92 Streptococcus A Rapid Scr w Reflx to PCR     Symptomatic COVID-19 Virus (Coronavirus) by PCR     Group A Streptococcus PCR Throat Swab     Group A Streptococcus PCR Throat Swab   2. Sore throat  J02.9    3. Encounter for laboratory testing for COVID-19 virus  Z20.828        Disposition:  Discharged to home.    Discharge Medications:  New Prescriptions    CIPROFLOXACIN (CIPRO) 500 MG TABLET    Take 1 tablet (500 mg) by mouth 2 times daily for 7 days    NEOMYCIN-POLYMYXIN-HYDROCORTISONE (CORTISPORIN) [id number] OTIC SOLUTION     Place 3 drops Into the left ear 4 times daily for 7 days       Scribe Disclosure:  I, Guilherme Parkinson, am serving as a scribe at 7:12 PM on 12/6/2020 to document services personally performed by Erick Lovell APRN based on my observations and the provider's statements to me.      Ana Cano PA-C  12/06/20 [id number]
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
## 2020-12-07 NOTE — DISCHARGE INSTRUCTIONS
Please review attached instructions. Follow up with your PCP in 2-3 days for reassessment.  Return to the ED as needed.     Discharge Instructions  COVID-19    COVID-19 is the disease caused by a new coronavirus. The virus spreads from person-to-person primarily by droplets when an infected person coughs or sneezes and the droplet either lands on another person or that other person touches a surface with the droplet on it. There are tests available to diagnose COVID-19. There is no specific treatment or medicine for the disease.    You may have been diagnosed with COVID, may be being tested for COVID and have a pending test result, or may have been exposed to COVID.    Symptoms of COVID-19  Many people have no symptoms or mild symptoms.  Symptoms may usually appear 4 to 5 days (up to 14 days) after contact with a person with COVID-19. Some people will get severe symptoms and pneumonia. Usual symptoms are:     ? Fever  ? Cough  ? Trouble breathing    Less common symptoms are: Headache, body aches, sore throat, sneezing, diarrhea,loss of taste or smell.    Isolation and Quarantine    You were seen because you have symptoms, had an exposure, or had some other concern about possible COVID. The best way to stop the spread of the virus is to avoid contact with others.  Isolation refers to sick people staying away from people who are not sick. A person in quarantine is limiting activity because they were exposed and are waiting to see if they might become sick.    If you test positive for COVID, you should stay home (isolation) for at least 10 days after your symptoms began, and for 24 hours with no fever and improvement of symptoms--whichever is longer. (Your fever should be gone for 24 hours without using fever-reducing medicine). If you have no symptoms, you should stay home (isolation) for 10 days from the day of the test.    For example, if you have a fever and cough for 6 days, you need to stay home 4 more days with  no fever for a total of 10 days. Or, if you have a fever and cough for 10 days, you need to stay home one more day with no fever for a total of 11 days.    If you have a high-risk exposure to COVID (you spent 15 minutes or more within six feet of somebody who has COVID), you should stay home (quarantine) for 14 days. Even if you test negative for COVID, the CDC recommends a 14-day quarantine from the time of your last exposure to that individual.    If you have symptoms but a negative test, you should stay at home until you are symptom-free and without fever for 24 hours, using the same judgment you would for when it is safe to return to work/school from strep throat, influenza, or the common cold. If you worsen, you should consider being re-evaluated.    If you are being tested for COVID and your test is pending, you should stay home until you know your test result.    How should I protect myself and others?    Do not go to work or school. Have a friend or relative do your shopping. Do not use public transportation (bus, train) or ridesharing ([employer], Uber).    Separate yourself from other people in your home.?As much as possible, you should stay in one room and away from other people in your home. Also, use a separate bathroom, if possible. Avoid handling pets or other animals while sick.     Wear a facemask if you need to be around other people and cover your mouth and nose with a tissue when you cough or sneeze.     Avoid sharing personal household items. You should not share dishes, drinking glasses, forks/knives/spoons, towels, or bedding with other people in your home. After using these items, they should be washed with soap and water. Clean parts of your home that are touched often (doorknobs, faucets, countertops, etc.) daily.     Wash your hands often with soap and water for at least 20 seconds or use an alcohol-based hand  containing at least 60% alcohol.     Avoid touching your face.    Treat your  symptoms. You can take Acetaminophen (Tylenol) to treat body aches and fever as needed for comfort. Ibuprofen (Advil or Motrin) can be used as well if you still have symptoms after taking Tylenol. Drink fluids. Rest.    Watch for worsening symptoms such as shortness of breath/difficulty breathing or very severe weakness.    Employers/workplaces are being asked by the Centers for Disease Control (CDC) to not request notes/documentation for you to return to work or prove that you were ill. You may choose to show your employer this paperwork. Also, repeat testing should not be required to return to work.    Return to the Emergency Department if:    If you are developing worsening breathing, shortness of breath, or feel worse you should seek medical attention.  If you are uncertain, contact your health care provider/clinic. If you need emergency medical attention, call 911 and tell them you have been ill.

## 2020-12-07 NOTE — LETTER
December 7, 2020      To Whom It May Concern:      Ana Enriquez was seen in our Emergency Department today, 12/07/20.  I expect her condition to improve over the next 1 day.  She may return to work/school when improved.      Sincerely,        MAX Powell      C/o Faheem Sauceda MD

## 2020-12-07 NOTE — ED AVS SNAPSHOT
[facility] Emergency Dept  [address] [address]  Phone: [phone]  Fax: [phone]                                    Ana Enriquez   MRN: [id number]    Department: [facility] Emergency Dept   Date of Visit: 12/7/2020           After Visit Summary Signature Page    I have received my discharge instructions, and my questions have been answered. I have discussed any challenges I see with this plan with the nurse or doctor.    ..........................................................................................................................................  Patient/Patient Representative Signature      ..........................................................................................................................................  Patient Representative Print Name and Relationship to Patient    ..................................................               ................................................  Date                                   Time    ..........................................................................................................................................  Reviewed by Signature/Title    ...................................................              ..............................................  Date                                               Time          22EPIC Rev 08/18

## 2020-12-14 ENCOUNTER — APPOINTMENT (OUTPATIENT)
Dept: GENERAL RADIOLOGY | Facility: CLINIC | Age: 30
End: 2020-12-14
Attending: EMERGENCY MEDICINE

## 2020-12-14 ENCOUNTER — HOSPITAL ENCOUNTER (EMERGENCY)
Facility: CLINIC | Age: 30
Discharge: HOME | End: 2020-12-14
Attending: EMERGENCY MEDICINE | Admitting: EMERGENCY MEDICINE

## 2020-12-14 VITALS
SYSTOLIC BLOOD PRESSURE: 108 MMHG | BODY MASS INDEX: 31.25 KG/M2 | OXYGEN SATURATION: 98 % | TEMPERATURE: 98.2 F | HEART RATE: 65 BPM | WEIGHT: 160 LBS | DIASTOLIC BLOOD PRESSURE: 78 MMHG | RESPIRATION RATE: 18 BRPM
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
## 2020-12-14 PROCEDURE — 99285 EMERGENCY DEPT VISIT HI MDM: CPT | Mod: 25

## 2020-12-14 PROCEDURE — 73502 X-RAY EXAM HIP UNI 2-3 VIEWS: CPT

## 2020-12-14 PROCEDURE — 250N000013 HC RX MED GY IP 250 OP 250 PS 637: Performed by: EMERGENCY MEDICINE

## 2020-12-14 PROCEDURE — 72040 X-RAY EXAM NECK SPINE 2-3 VW: CPT

## 2020-12-14 PROCEDURE — 73110 X-RAY EXAM OF WRIST: CPT | Mod: LT

## 2020-12-14 PROCEDURE — 71046 X-RAY EXAM CHEST 2 VIEWS: CPT

## 2020-12-14 RX ORDER — ACETAMINOPHEN 500 MG
1000 TABLET ORAL ONCE
Status: COMPLETED | OUTPATIENT
Start: 2020-12-14 | End: 2020-12-14

## 2020-12-14 RX ORDER — SODIUM CHLORIDE 9 MG/ML
INJECTION, SOLUTION INTRAVENOUS CONTINUOUS
Status: DISCONTINUED | OUTPATIENT
Start: 2020-12-14 | End: 2020-12-14

## 2020-12-14 RX ORDER — HYDROMORPHONE HYDROCHLORIDE 1 MG/ML
0.5 INJECTION, SOLUTION INTRAMUSCULAR; INTRAVENOUS; SUBCUTANEOUS
Status: DISCONTINUED | OUTPATIENT
Start: 2020-12-14 | End: 2020-12-14

## 2020-12-14 RX ADMIN — ACETAMINOPHEN 1000 MG: 500 TABLET, FILM COATED ORAL at 10:44

## 2020-12-14 ASSESSMENT — ENCOUNTER SYMPTOMS
HEADACHES: 0
BACK PAIN: 0
NECK PAIN: 1
ABDOMINAL PAIN: 0
SHORTNESS OF BREATH: 0
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
## 2020-12-14 NOTE — LETTER
December 14, 2020      To Whom It May Concern:      Ana Enriquez was seen in our Emergency Department today, 12/14/20.    She may return to work following recommendations given by doctor at follow-up  appointment with primary doctor.     Sincerely,        Lindsey Grey RN

## 2020-12-14 NOTE — ED AVS SNAPSHOT
[facility] Emergency Dept  [address]  [facility] [address]  Phone: [phone]  Fax: [phone]                                    Ana Enriquez   MRN: [id number]    Department: [facility] Emergency Dept   Date of Visit: 12/14/2020           After Visit Summary Signature Page    I have received my discharge instructions, and my questions have been answered. I have discussed any challenges I see with this plan with the nurse or doctor.    ..........................................................................................................................................  Patient/Patient Representative Signature      ..........................................................................................................................................  Patient Representative Print Name and Relationship to Patient    ..................................................               ................................................  Date                                   Time    ..........................................................................................................................................  Reviewed by Signature/Title    ...................................................              ..............................................  Date                                               Time          22EPIC Rev 08/18

## 2020-12-22 ENCOUNTER — HOSPITAL ENCOUNTER (EMERGENCY)
Facility: CLINIC | Age: 30
Discharge: HOME | End: 2020-12-22
Attending: EMERGENCY MEDICINE | Admitting: EMERGENCY MEDICINE

## 2020-12-22 ENCOUNTER — APPOINTMENT (OUTPATIENT)
Dept: CT IMAGING | Facility: CLINIC | Age: 30
End: 2020-12-22
Attending: EMERGENCY MEDICINE

## 2020-12-22 VITALS
RESPIRATION RATE: 18 BRPM | OXYGEN SATURATION: 100 % | HEART RATE: 63 BPM | TEMPERATURE: 97.9 F | DIASTOLIC BLOOD PRESSURE: 78 MMHG | SYSTOLIC BLOOD PRESSURE: 110 MMHG

## 2020-12-22 DIAGNOSIS — R10.84 ABDOMINAL PAIN, GENERALIZED: ICD-10-CM

## 2020-12-22 LAB
ALBUMIN UR-MCNC: 10 MG/DL
ANION GAP SERPL CALCULATED.3IONS-SCNC: 4 MMOL/L (ref 3–14)
APPEARANCE UR: CLEAR
B-HCG FREE SERPL-ACNC: <5 IU/L
BILIRUB UR QL STRIP: NEGATIVE
BUN SERPL-MCNC: 16 MG/DL (ref 7–30)
CALCIUM SERPL-MCNC: 9.2 MG/DL (ref 8.5–10.1)
CHLORIDE SERPL-SCNC: 110 MMOL/L (ref 94–109)
CO2 SERPL-SCNC: 27 MMOL/L (ref 20–32)
COLOR UR AUTO: YELLOW
CREAT SERPL-MCNC: 0.92 MG/DL (ref 0.52–1.04)
ERYTHROCYTE [DISTWIDTH] IN BLOOD BY AUTOMATED COUNT: 14.6 % (ref 10–15)
GFR SERPL CREATININE-BSD FRML MDRD: 83 ML/MIN/{1.73_M2}
GLUCOSE SERPL-MCNC: 89 MG/DL (ref 70–99)
GLUCOSE UR STRIP-MCNC: NEGATIVE MG/DL
HCT VFR BLD AUTO: 38.5 % (ref 35–47)
HGB BLD-MCNC: 11.8 G/DL (ref 11.7–15.7)
HGB UR QL STRIP: NEGATIVE
KETONES UR STRIP-MCNC: ABNORMAL MG/DL
LEUKOCYTE ESTERASE UR QL STRIP: NEGATIVE
MCH RBC QN AUTO: 27.8 PG (ref 26.5–33)
MCHC RBC AUTO-ENTMCNC: 30.6 G/DL (ref 31.5–36.5)
MCV RBC AUTO: 91 FL (ref 78–100)
MUCOUS THREADS #/AREA URNS LPF: PRESENT /LPF
NITRATE UR QL: NEGATIVE
PH UR STRIP: 5.5 PH (ref 5–7)
PLATELET # BLD AUTO: 224 10E9/L (ref 150–450)
POTASSIUM SERPL-SCNC: 3.5 MMOL/L (ref 3.4–5.3)
RBC # BLD AUTO: 4.25 10E12/L (ref 3.8–5.2)
RBC #/AREA URNS AUTO: 1 /HPF (ref 0–2)
SODIUM SERPL-SCNC: 141 MMOL/L (ref 133–144)
SOURCE: ABNORMAL
SP GR UR STRIP: 1.03 (ref 1–1.03)
SQUAMOUS #/AREA URNS AUTO: 3 /HPF (ref 0–1)
UROBILINOGEN UR STRIP-MCNC: NORMAL MG/DL (ref 0–2)
WBC # BLD AUTO: 5.3 10E9/L (ref 4–11)
WBC #/AREA URNS AUTO: <1 /HPF (ref 0–5)

## 2020-12-22 PROCEDURE — 84702 CHORIONIC GONADOTROPIN TEST: CPT

## 2020-12-22 PROCEDURE — 74177 CT ABD & PELVIS W/CONTRAST: CPT

## 2020-12-22 PROCEDURE — 250N000009 HC RX 250: Performed by: EMERGENCY MEDICINE

## 2020-12-22 PROCEDURE — 81001 URINALYSIS AUTO W/SCOPE: CPT | Performed by: EMERGENCY MEDICINE

## 2020-12-22 PROCEDURE — 80048 BASIC METABOLIC PNL TOTAL CA: CPT | Performed by: EMERGENCY MEDICINE

## 2020-12-22 PROCEDURE — 99285 EMERGENCY DEPT VISIT HI MDM: CPT | Mod: 25

## 2020-12-22 PROCEDURE — 85027 COMPLETE CBC AUTOMATED: CPT | Performed by: EMERGENCY MEDICINE

## 2020-12-22 PROCEDURE — 250N000011 HC RX IP 250 OP 636: Performed by: EMERGENCY MEDICINE

## 2020-12-22 RX ORDER — POLYETHYLENE GLYCOL 3350 17 G/17G
1 POWDER, FOR SOLUTION ORAL 2 TIMES DAILY PRN
Qty: 527 G | Refills: 0 | Status: SHIPPED | OUTPATIENT
Start: 2020-12-22 | End: 2021-01-21

## 2020-12-22 RX ORDER — DICYCLOMINE HCL 20 MG
20 TABLET ORAL 4 TIMES DAILY PRN
Qty: 15 TABLET | Refills: 0 | Status: SHIPPED | OUTPATIENT
Start: 2020-12-22

## 2020-12-22 RX ORDER — IOPAMIDOL 755 MG/ML
500 INJECTION, SOLUTION INTRAVASCULAR ONCE
Status: COMPLETED | OUTPATIENT
Start: 2020-12-22 | End: 2020-12-22

## 2020-12-22 RX ADMIN — IOPAMIDOL 68 ML: 755 INJECTION, SOLUTION INTRAVENOUS at 19:32

## 2020-12-22 RX ADMIN — SODIUM CHLORIDE 60 ML: 9 INJECTION, SOLUTION INTRAVENOUS at 19:34

## 2020-12-22 ASSESSMENT — ENCOUNTER SYMPTOMS
CONSTIPATION: 1
HEADACHES: 0
LIGHT-HEADEDNESS: 1
NAUSEA: 0
ABDOMINAL PAIN: 1
VOMITING: 0

## 2020-12-22 NOTE — ED AVS SNAPSHOT
[facility] Emergency Dept  [address]  [facility] [address]  Phone: [phone]  Fax: [phone]                                    Ana Enriquez   MRN: [id number]    Department: [facility] Emergency Dept   Date of Visit: 12/22/2020           After Visit Summary Signature Page    I have received my discharge instructions, and my questions have been answered. I have discussed any challenges I see with this plan with the nurse or doctor.    ..........................................................................................................................................  Patient/Patient Representative Signature      ..........................................................................................................................................  Patient Representative Print Name and Relationship to Patient    ..................................................               ................................................  Date                                   Time    ..........................................................................................................................................  Reviewed by Signature/Title    ...................................................              ..............................................  Date                                               Time          22EPIC Rev 08/18
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
## 2021-05-07 ENCOUNTER — HOSPITAL ENCOUNTER (EMERGENCY)
Facility: CLINIC | Age: 31
Discharge: HOME | End: 2021-05-07
Attending: EMERGENCY MEDICINE | Admitting: EMERGENCY MEDICINE
Payer: MEDICAID

## 2021-05-07 ENCOUNTER — APPOINTMENT (OUTPATIENT)
Dept: GENERAL RADIOLOGY | Facility: CLINIC | Age: 31
End: 2021-05-07
Attending: EMERGENCY MEDICINE
Payer: MEDICAID

## 2021-05-07 VITALS
OXYGEN SATURATION: 98 % | BODY MASS INDEX: 31.25 KG/M2 | RESPIRATION RATE: 16 BRPM | SYSTOLIC BLOOD PRESSURE: 127 MMHG | DIASTOLIC BLOOD PRESSURE: 76 MMHG | WEIGHT: 160 LBS | TEMPERATURE: 97.5 F | HEART RATE: 66 BPM

## 2021-05-07 DIAGNOSIS — M25.512 ACUTE PAIN OF LEFT SHOULDER: ICD-10-CM

## 2021-05-07 PROCEDURE — 99283 EMERGENCY DEPT VISIT LOW MDM: CPT

## 2021-05-07 PROCEDURE — 250N000013 HC RX MED GY IP 250 OP 250 PS 637: Performed by: EMERGENCY MEDICINE

## 2021-05-07 PROCEDURE — 73030 X-RAY EXAM OF SHOULDER: CPT | Mod: LT

## 2021-05-07 RX ORDER — IBUPROFEN 800 MG/1
800 TABLET, FILM COATED ORAL ONCE
Status: COMPLETED | OUTPATIENT
Start: 2021-05-07 | End: 2021-05-07

## 2021-05-07 RX ORDER — CYCLOBENZAPRINE HCL 10 MG
10 TABLET ORAL 3 TIMES DAILY PRN
Qty: 8 TABLET | Refills: 0 | Status: SHIPPED | OUTPATIENT
Start: 2021-05-07 | End: 2021-05-13

## 2021-05-07 RX ADMIN — IBUPROFEN 800 MG: 800 TABLET ORAL at 15:01

## 2021-05-07 ASSESSMENT — ENCOUNTER SYMPTOMS: ARTHRALGIAS: 1

## 2021-05-07 NOTE — Clinical Note
Ana Enriquez was seen and treated in our emergency department on 5/7/2021.  She may return to work on 05/08/2021.  Patient cannot utilize her left arm until cleared by orthopedic surgery. She has an appointment on May 10th to further provide work restrictions.     If you have any questions or concerns, please don't hesitate to call.      Jimenez Bernard MD
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
## 2021-11-15 ENCOUNTER — HOSPITAL ENCOUNTER (EMERGENCY)
Facility: CLINIC | Age: 31
Discharge: HOME | End: 2021-11-15
Attending: EMERGENCY MEDICINE | Admitting: EMERGENCY MEDICINE
Payer: COMMERCIAL

## 2021-11-15 VITALS
OXYGEN SATURATION: 98 % | DIASTOLIC BLOOD PRESSURE: 78 MMHG | HEART RATE: 82 BPM | TEMPERATURE: 96.8 F | RESPIRATION RATE: 18 BRPM | SYSTOLIC BLOOD PRESSURE: 119 MMHG

## 2021-11-15 DIAGNOSIS — R22.0 SUBMANDIBULAR SWELLING: ICD-10-CM

## 2021-11-15 DIAGNOSIS — R22.1 SUBMANDIBULAR SWELLING: ICD-10-CM

## 2021-11-15 DIAGNOSIS — R59.9 REACTIVE LYMPHADENOPATHY: ICD-10-CM

## 2021-11-15 PROCEDURE — 250N000011 HC RX IP 250 OP 636: Performed by: EMERGENCY MEDICINE

## 2021-11-15 PROCEDURE — 99284 EMERGENCY DEPT VISIT MOD MDM: CPT | Mod: 25

## 2021-11-15 PROCEDURE — 96374 THER/PROPH/DIAG INJ IV PUSH: CPT

## 2021-11-15 RX ORDER — DEXAMETHASONE SODIUM PHOSPHATE 10 MG/ML
10 INJECTION, SOLUTION INTRAMUSCULAR; INTRAVENOUS ONCE
Status: COMPLETED | OUTPATIENT
Start: 2021-11-15 | End: 2021-11-15

## 2021-11-15 RX ADMIN — DEXAMETHASONE SODIUM PHOSPHATE 10 MG: 10 INJECTION INTRAMUSCULAR; INTRAVENOUS at 12:41

## 2021-11-15 ASSESSMENT — ENCOUNTER SYMPTOMS: FEVER: 0

## 2021-11-15 NOTE — ED PROVIDER NOTES
History   Chief Complaint:  Pharyngitis     HPI   Ana Enriquez is a 31 year old female with history of a recent streptococcus infection who presents with swelling in her left neck. The patient reports onset of neck swelling a couple of days ago. She says she and 3 of her 4 kids were diagnosed with strep, and she has been experiencing symptoms for about two weeks. She says she also had a recent case of thrush for which she is being treated. She reports a history of root canals, and she is scheduled for a couple of tooth extractions on 12/8/21. She denies any fevers, ear aches, ear pain, or discharge from ears. She has never been treated with a steroid. She is not currently vaccinated for Covid-19.    Review of Systems   Constitutional: Negative for fever.   HENT: Positive for dental problem. Negative for ear discharge and ear pain.    All other systems reviewed and are negative.    Allergies:  Penicillins  Latex    Medications:  Zithromax  Bentyl    Past Medical History:     Bipolar 1 disorder  Migraines    Enlarged thyroid   Abscess of bartholin's gland  Tobacco use disorder  Anxiety  Depression    Past Surgical History:    Tubal ligation     Family History:    The patient denies past family history.     Social History:  The patient presents alone. She is a current everyday smoker.    Physical Exam     Patient Vitals for the past 24 hrs:   BP Temp Pulse Resp SpO2   11/15/21 1156 119/78 96.8  F (36  C) 82 18 98 %     Physical Exam  General: Alert, appears well-developed and well-nourished. Cooperative.     In mild distress  HEENT:  Head:  Atraumatic  Ears:  External ears are normal.  Bilateral TMs normal  Mouth/Throat:  Oropharynx is without erythema or exudate and mucous membranes are moist.  Normal dentition.  No swelling or tenderness to the soft tissues below the tongue.  No mass detected.  No gingival tenderness to the left lower gum lines.  No palpable abscess detected.  There is a swelling to the soft  tissues on the external portion of the submandibular soft tissues.  I suspect this may be a lymph node.  No thyroid swelling on neck examination..   Eyes:   Conjunctivae normal and EOM are normal. No scleral icterus.    Pupils are equal, round, and reactive to light.   Neck:   Normal range of motion. Neck supple.  No meningismus  CV:  Normal rate, regular rhythm, normal heart sounds and radial pulses are 2+ and symmetric.  No murmur.  Resp:  Breath sounds are clear bilaterally    Non-labored, no retractions or accessory muscle use  MS:  Normal range of motion. No edema.    Normal strength in all 4 extremities.   Skin:  Warm and dry.  No rash or lesions noted.  Neuro: Alert. Normal strength.  GCS: 15  Psych:  Normal mood and affect.  Lymph: There is a swollen lymph node to the submandibular soft tissues of the left part of the jaw.  There is no anterior posterior cervical lymphadenopathy noted    Emergency Department Course     Procedures  None    Emergency Department Course:  Reviewed:  I reviewed nursing notes, vitals, past medical history and Care Everywhere    Assessments:  1235 I obtained history and examined the patient as noted above.     Interventions:  1241 Decadron, 10 mg, IV    Disposition:  The patient was discharged to home.     Impression & Plan     Medical Decision Making:  Patient is a 31-year-old female with a recent history of strep pharyngitis and thrush infection who presents with left submandibular swelling.  Patient notes that the swelling has been present for several days at this point.  She is not currently taking any anti-inflammatory medications.  With recent infectious symptoms I do suspect this may be reactive lymphadenopathy.  On her oral examination I see no swelling or signs of abscess or poor dentition that would lead me to a higher suspicion for periapical abscess or odontogenic infection.  Patient was quite concerned whether this may represent a tumor or cancer although she was  reassured that with the swelling being so acute and in the setting of post infectious etiologies much more concerned that this would be a reactive lymph node versus a malignant tumor.  Certainly if her symptoms were to not improve in the next 1 to 2 weeks she should follow-up with otolaryngology for reassessment and determination of potential advanced imaging such as ultrasound or CT imaging as well as possible need for biopsy.  In the meantime we will trial a dose of Decadron to see if this improves the suspected lymphadenopathy continue with NSAID medications with ibuprofen every 6 hours as needed for pain and/or swelling.  Notification for further antibiotics at this time is low concern for acute infectious process.  After all questions answered return precautions understood, discharged home.  Referred to otolaryngology for outpatient follow-up.    Lastly we discussed the patient's unvaccinated status regarding COVID-19.  She had questions answered at bedside & we discussed that the Pfizer vaccine would be appropriate for her.  We discussed the safety and efficacy of this vaccine and she seemed interested in potentially pursuing the immunization in the near future.      Diagnosis:    ICD-10-CM    1. Submandibular swelling  R22.0     R22.1    2. Reactive lymphadenopathy  R59.9        Discharge Medications:  New Prescriptions    No medications on file       Scribe Disclosure:  Huyen TREVINO, am serving as a scribe at 12:20 PM on 11/15/2021 to document services personally performed by Oziel Mondragon MD based on my observations and the provider's statements to me.      Oziel Mondragon MD  11/15/21 5066

## 2021-11-15 NOTE — ED TRIAGE NOTES
Patient presents to the ED reporting a swollen lymph gland on the left neck. States that was placed on a z-pack for symptoms and just finished yesterday but has not improved. Notes kids were positive for strep, but she tested negative.

## 2021-11-15 NOTE — DISCHARGE INSTRUCTIONS
Do not delay in getting vaccinated for COVID-19.  I would recommend the Pfizer vaccine as we discussed in the ED today.  It is safe, effective and will protect you and your family!  Please do not hesitate to contact your primary care provider if you have more questions about the vaccine or it's effectiveness/safety.      If the swelling of the left submandibular soft tissues is not improving you should follow-up with the ENT doctor in the next 1 to 2 weeks.  At that time you can discuss further imaging and/or need for potential biopsy.  As discussed today we do think this is likely due to recent infectious symptoms and so likely reactive lymph node swelling.  Continue taking ibuprofen every 6 hours as needed for inflammation and/or pain.  He received a dose of steroids here in the emergency department which is the last for the next 72 hours to help with the swelling and inflammation of the suspected lymph node.

## 2022-05-10 ENCOUNTER — HOSPITAL ENCOUNTER (EMERGENCY)
Facility: CLINIC | Age: 32
Discharge: HOME | End: 2022-05-10
Attending: PHYSICIAN ASSISTANT | Admitting: PHYSICIAN ASSISTANT
Payer: COMMERCIAL

## 2022-05-10 ENCOUNTER — APPOINTMENT (OUTPATIENT)
Dept: GENERAL RADIOLOGY | Facility: CLINIC | Age: 32
End: 2022-05-10
Attending: PHYSICIAN ASSISTANT
Payer: COMMERCIAL

## 2022-05-10 VITALS
HEART RATE: 87 BPM | TEMPERATURE: 98.5 F | DIASTOLIC BLOOD PRESSURE: 79 MMHG | OXYGEN SATURATION: 100 % | RESPIRATION RATE: 16 BRPM | SYSTOLIC BLOOD PRESSURE: 115 MMHG

## 2022-05-10 DIAGNOSIS — J06.9 UPPER RESPIRATORY TRACT INFECTION, UNSPECIFIED TYPE: ICD-10-CM

## 2022-05-10 LAB
ATRIAL RATE - MUSE: 86 BPM
DIASTOLIC BLOOD PRESSURE - MUSE: NORMAL MMHG
FLUAV RNA SPEC QL NAA+PROBE: NEGATIVE
FLUBV RNA RESP QL NAA+PROBE: NEGATIVE
INTERPRETATION ECG - MUSE: NORMAL
P AXIS - MUSE: 67 DEGREES
PR INTERVAL - MUSE: 148 MS
QRS DURATION - MUSE: 76 MS
QT - MUSE: 350 MS
QTC - MUSE: 418 MS
R AXIS - MUSE: 62 DEGREES
RSV RNA SPEC NAA+PROBE: NEGATIVE
SARS-COV-2 RNA RESP QL NAA+PROBE: NEGATIVE
SYSTOLIC BLOOD PRESSURE - MUSE: NORMAL MMHG
T AXIS - MUSE: 12 DEGREES
VENTRICULAR RATE- MUSE: 86 BPM

## 2022-05-10 PROCEDURE — 99284 EMERGENCY DEPT VISIT MOD MDM: CPT | Mod: CS,25

## 2022-05-10 PROCEDURE — 87637 SARSCOV2&INF A&B&RSV AMP PRB: CPT | Performed by: PHYSICIAN ASSISTANT

## 2022-05-10 PROCEDURE — 250N000013 HC RX MED GY IP 250 OP 250 PS 637: Performed by: PHYSICIAN ASSISTANT

## 2022-05-10 PROCEDURE — 93005 ELECTROCARDIOGRAM TRACING: CPT

## 2022-05-10 PROCEDURE — C9803 HOPD COVID-19 SPEC COLLECT: HCPCS

## 2022-05-10 PROCEDURE — 71046 X-RAY EXAM CHEST 2 VIEWS: CPT

## 2022-05-10 RX ORDER — IBUPROFEN 600 MG/1
600 TABLET, FILM COATED ORAL ONCE
Status: COMPLETED | OUTPATIENT
Start: 2022-05-10 | End: 2022-05-10

## 2022-05-10 RX ADMIN — IBUPROFEN 600 MG: 600 TABLET ORAL at 11:33

## 2022-05-10 ASSESSMENT — ENCOUNTER SYMPTOMS
CHILLS: 1
SHORTNESS OF BREATH: 0
NAUSEA: 0
VOMITING: 0
FEVER: 0
DIARRHEA: 1
SORE THROAT: 1
RHINORRHEA: 0
ABDOMINAL PAIN: 0
COUGH: 1

## 2022-05-10 NOTE — ED TRIAGE NOTES
Patient presents to the ED with cough, sore throat and nasal congestion. Symptoms since yesterday.

## 2022-05-10 NOTE — ED PROVIDER NOTES
History   Chief Complaint:  Cough     The history is provided by the patient.      Ana Enriquez is a 31 year old female who presents with cough. The patient tells us that three days ago she developed a sore throat, chills, chest pain, diarrhea and a dry cough. She describes the chest pain as throbbing. She tells us that sometimes when she clears her throat there are streaks of blood in the phlegm. For her symptoms, the patient has been taking Theraflu. The patient denies shortness of breath, rhinorrhea, abdominal pain, nausea, fever or vomiting.     To note, the patient is not vaccinated against COVID-19.     Review of Systems   Constitutional: Positive for chills. Negative for fever.   HENT: Positive for sore throat. Negative for rhinorrhea.    Respiratory: Positive for cough. Negative for shortness of breath.    Cardiovascular: Positive for chest pain.   Gastrointestinal: Positive for diarrhea. Negative for abdominal pain, nausea and vomiting.   All other systems reviewed and are negative.    Allergies:  Penicillins  Latex    Medications:  ]Bentyl     Past Medical History:     Bipolar 1 disorder  Migraine  Muscle spasms of head and/or neck   Enlarged thyroid  Abscess of Bartholin's gland       Past Surgical History:    Tubal ligation   Colposcopy      Family History:    Diabetes    Social History:  The patient presents to the ED alone  The patient works for [employer]   The patient has multiple children     Physical Exam     Patient Vitals for the past 24 hrs:   BP Temp Temp src Pulse Resp SpO2   05/10/22 1100 115/79 98.5  F (36.9  C) Temporal 87 16 100 %     Physical Exam    General: Well appearing, pleasant female, resting on exam bed  HEENT: No evidence of trauma.  Conjunctive are clear.  Extraocular eye movements intact.  Neck range of motion intact.  Nose and throat clear.  Respiratory: Good breath sounds bilaterally  Cardiovascular: Normal rate and rhythm   Gastrointestinal: Soft,  nontender.   Musculoskeletal: Atraumatic  Skin: Exposed skin clear.  Neurologic: Alert.  Psych:  Patient is cooperative, with normal affect.    Emergency Department Course   ECG  ECG obtained at 1137, ECG read at 1141  Normal sinus rhythm. Possible left atrial enlargement. Borderline ECG.  Rate 86 bpm. PA interval 148 ms. QRS duration 76 ms. QT/QTc 350/418 ms. P-R-T axes 67 62 12.     Imaging:  Chest XR,  PA & LAT   Final Result   IMPRESSION: No acute cardiopulmonary disease.      DONNA TAN MD            SYSTEM ID:  [id number]        Report per radiology    Laboratory:  Labs Ordered and Resulted from Time of ED Arrival to Time of ED Departure - No data to display     Emergency Department Course:    Reviewed:  I reviewed nursing notes, vitals, past medical history, Care Everywhere and [facility]    Assessments:  1115 I obtained history and examined the patient as noted above.   1210 I rechecked the patient and explained findings.     Interventions:  1133 Ibuprofen 600 mg PO     Disposition:  The patient was discharged to home.     Impression & Plan     Medical Decision Making:  Ana Enriquez is a 31 year old female presents emergency room today for evaluation of URI symptoms and diarrhea.  See HPI.  Her vitals are unremarkable.  She has a reassuring exam and is in no acute distress.  She was complaining of some chest pain so we completed a chest x-ray and EKG that were unremarkable.  COVID and influenza swab are pending.  She is likely suffering from a viral URI.  We will call with results.  Symptomatic care is otherwise indicated and she is to return with new or worsening symptoms.  She is low risk for pulmonary embolism so we will hold off on further work-up regarding this at this time.  Unlikely dissection or unstable angina.  Follow-up should she fail to improve and return if worse.    COVID and influenza are negative. Plan same.    Diagnosis:    ICD-10-CM    1. Upper respiratory tract infection, unspecified type   J06.9      Discharge Medications:  Discharge Medication List as of 5/10/2022 12:17 PM        Scribe Disclosure:  I, Polo Ball, am serving as a scribe at 11:03 AM on 5/10/2022 to document services personally performed by Quoc Mckeon PA-C, based on my observations and the provider's statements to me.              Quoc Mckeon PA-C  05/10/22 1358

## 2022-09-21 ENCOUNTER — HOSPITAL ENCOUNTER (EMERGENCY)
Facility: CLINIC | Age: 32
Discharge: HOME | End: 2022-09-21
Attending: PHYSICIAN ASSISTANT | Admitting: PHYSICIAN ASSISTANT
Payer: COMMERCIAL

## 2022-09-21 VITALS
HEART RATE: 78 BPM | SYSTOLIC BLOOD PRESSURE: 112 MMHG | DIASTOLIC BLOOD PRESSURE: 70 MMHG | RESPIRATION RATE: 16 BRPM | OXYGEN SATURATION: 98 % | TEMPERATURE: 98.2 F

## 2022-09-21 DIAGNOSIS — H92.01 OTALGIA, RIGHT: ICD-10-CM

## 2022-09-21 DIAGNOSIS — R59.1 LYMPHADENOPATHY: ICD-10-CM

## 2022-09-21 PROCEDURE — 99282 EMERGENCY DEPT VISIT SF MDM: CPT

## 2022-09-21 ASSESSMENT — ENCOUNTER SYMPTOMS
CHILLS: 0
FEVER: 0

## 2022-09-21 ASSESSMENT — ACTIVITIES OF DAILY LIVING (ADL): ADLS_ACUITY_SCORE: 33

## 2022-09-21 NOTE — ED PROVIDER NOTES
History   Chief Complaint:  Cyst     HPI   Ana Enriquez is a 32 year old female who presents with worsening left axiliary swelling that began a few days ago. She is concerned this may be a cyst. Patient says she does shave the area and recently shaved. She also changed to a new soap. Patient has never had anything like this before. She denies fevers and chills. No rashes or redness down her arm. Patient reports a recent COVID-19 infection two weeks ago that involved rhinorrhea and congestion. No recent vaccinations. She denies any history of abscesses that required an I & D.     Patient also complains of right sided ear pain and drainage that she noticed after waking up this morning. She did not take any analgesics prior to arrival.     Review of Systems   Constitutional: Negative for chills and fever.   HENT: Positive for ear discharge and ear pain.    Skin: Negative for rash.        (+) swelling to left axiliary    All other systems reviewed and are negative.    Allergies:  Penicillins  Latex    Medications:  Bentyl     Past Medical History:     HSV-1 infection  Enlarged thyroid  Abscess of Bartholin's gland  Tobacco use disorder   BHARTI I  Eczema   Migraines  Anxiety  Depression   Bipolar 1 disorder     Past Surgical History:    Tubal ligation    Colposcopy x2    Family History:    Father: Diabetes   Mother: Allergies    Social History:  The patient presents to the ED with her son  Arrives via private vehicle  PCP: Tania Schofield     Physical Exam     Patient Vitals for the past 24 hrs:   BP Temp Pulse Resp SpO2   09/21/22 1016 112/70 98.2  F (36.8  C) 78 16 98 %     Physical Exam  Constitutional: Pleasant. Cooperative.  Eyes: Pupils equally round  HENT: Head is normal in appearance. Oropharynx is normal with moist mucus membranes. Bilateral TMs and canals are unremarkable.  Cardiovascular: Regular rate and rhythm without murmurs.  Respiratory: Normal respiratory effort, lungs clear to  auscultation  MSK/Skin: Left-sided axillary nodule noted. This is firm, no fluctuance. No erythema. 2+ radial pulse. No erythema overlying nodule, no warm.  Neurologic: Cranial nerves grossly intact, normal cognition, no apparent deficits. Sensation intact to bilateral upper extremities.  Psychiatric: Normal affect.  Nursing notes and vital signs reviewed.    Emergency Department Course     Procedures  None    Emergency Department Course:   Reviewed:  I reviewed nursing notes, vitals, past medical history and Care Everywhere    Assessments:  1238 I obtained history and examined the patient as noted above.     Disposition:  The patient was discharged to home.     Impression & Plan   Medical Decision Making:  Ana Enriquez is a 32 year old female who presents to the ED for evaluation of swelling to her left axilla as well as ear pain.  See HPI as above for additional details.  Vitals and physical exam as above.  Differential was broad included abscess, lymphadenopathy, cellulitis, otitis media, otitis externa mastoiditis, amongst others.  No warmth, erythema, marked tenderness to suggest for abscess or cellulitis.   axillary swelling seems consistent with lymphadenopathy.  No evidence for otitis media or otitis externa or mastoiditis on my exam patient's ear.  Etiology of otalgia is unclear at this time.  Given concurrent congestion, this may represent eustachian tube dysfunction.  Advised Tylenol and ibuprofen.  Felt patient was safe for discharge to home. Discussed reasons to return. All questions answered. Patient discharged to home in stable condition.    Covid-19  Ana Enriquez was evaluated during a global COVID-19 pandemic, which necessitated consideration that the patient might be at risk for infection with the SARS-CoV-2 virus that causes COVID-19.   Applicable protocols for evaluation were followed during the patient's care.   COVID-19 was considered as part of the patient's evaluation.       Diagnosis:     ICD-10-CM    1. Lymphadenopathy  R59.1    2. Otalgia, right  H92.01        Scribe Disclosure:  I, Jaylene Melnedrez, am serving as a scribe at 11:54 AM on 9/21/2022 to document services personally performed by Isaiah Holland PA-C based on my observations and the provider's statements to me.     This record was created at least in part using electronic voice recognition software, so please excuse any typographical errors.       Isaiah Holland PA-C  09/21/22 4744

## 2022-09-21 NOTE — ED TRIAGE NOTES
Pt complains of a cyst under her left arm for 3 days increasing in size.           Triage Assessment     Row Name 09/21/22 1015       Triage Assessment (Adult)    Airway WDL WDL       Respiratory WDL    Respiratory WDL WDL

## 2022-09-21 NOTE — LETTER
September 21, 2022      To Whom It May Concern:      Ana Enriquez was seen in our Emergency Department today, 09/21/22. She may return to work when improved.    Sincerely,        Isaiah Holland PA-C

## 2023-02-14 ENCOUNTER — HOSPITAL ENCOUNTER (EMERGENCY)
Facility: CLINIC | Age: 33
Discharge: LEFT BEFORE DISCHARGE | End: 2023-02-14
Payer: COMMERCIAL

## 2023-02-14 ENCOUNTER — HOSPITAL ENCOUNTER (EMERGENCY)
Facility: CLINIC | Age: 33
Discharge: HOME | End: 2023-02-14
Attending: PHYSICIAN ASSISTANT | Admitting: PHYSICIAN ASSISTANT
Payer: COMMERCIAL

## 2023-02-14 VITALS
SYSTOLIC BLOOD PRESSURE: 113 MMHG | WEIGHT: 160 LBS | OXYGEN SATURATION: 97 % | DIASTOLIC BLOOD PRESSURE: 79 MMHG | BODY MASS INDEX: 31.25 KG/M2 | RESPIRATION RATE: 16 BRPM | TEMPERATURE: 97.8 F | HEART RATE: 84 BPM

## 2023-02-14 VITALS
RESPIRATION RATE: 19 BRPM | TEMPERATURE: 97.8 F | DIASTOLIC BLOOD PRESSURE: 80 MMHG | OXYGEN SATURATION: 99 % | HEART RATE: 96 BPM | SYSTOLIC BLOOD PRESSURE: 115 MMHG

## 2023-02-14 DIAGNOSIS — L02.412 ABSCESS OF LEFT AXILLA: ICD-10-CM

## 2023-02-14 PROCEDURE — 99283 EMERGENCY DEPT VISIT LOW MDM: CPT | Mod: 25

## 2023-02-14 PROCEDURE — 10060 I&D ABSCESS SIMPLE/SINGLE: CPT

## 2023-02-14 RX ORDER — DOXYCYCLINE 100 MG/1
100 CAPSULE ORAL 2 TIMES DAILY
Qty: 14 CAPSULE | Refills: 0 | Status: SHIPPED | OUTPATIENT
Start: 2023-02-14 | End: 2023-02-21

## 2023-02-14 ASSESSMENT — ACTIVITIES OF DAILY LIVING (ADL)
ADLS_ACUITY_SCORE: 33
ADLS_ACUITY_SCORE: 33

## 2023-02-14 NOTE — ED TRIAGE NOTES
Pt arrives ambulatory for left armpit cyst. Has had around 1 year but is worsening over the last two days. Today has increased pain, swelling, and tingling down left arm and intermittent numbness. No daily meds. Took ibuprofen this morning.

## 2023-02-14 NOTE — LETTER
February 14, 2023      To Whom It May Concern:      Ana Enriquez was seen in our Emergency Department today, 02/14/23.  I expect her condition to improve over the next 1-3 days.  She may return to work/school when improved.    Sincerely,        Jaky CASTELLANOS RN

## 2023-02-14 NOTE — ED TRIAGE NOTES
"Pt c/o cyst in L underarm. Pt states it goes \"up and down.\" for past year. Pt reports cyst today is aprox 1/2 dollar sized. Pt states painful. Denies discharge or head. ABC in tact. A/Ox4      "

## 2023-02-15 NOTE — ED PROVIDER NOTES
History     Chief Complaint:  Cyst and Arm Pain       The history is provided by the patient.      Ana Enriquez is a 32 year old female with a history of BHARTI I, LGSIL, ASCUS, abscess of Bartholin's gland, among others, who presents with a left axilla cyst that has been present for the last year. The patient notes that she has experienced increased swelling, pain, and redness over the last 3 days prompting her arrival to the ED. Patient denies drainage. She has not been seen by her PCP for this issue. No fevers.    Independent Historian:   None - Patient Only    Review of External Notes:    ROS:  Review of Systems   None besides those mentioned in the HPI.    Allergies:  Penicillins  Latex     Medications:    Bentyl  Zofran ODT  Flonase    Past Medical History:    Abnormal Pap smear   Bipolar 1 disorder  Migraine   Muscle spasms of head and/or neck   Wounds and injuries   HSV-1  Enlarged thyroid  Abscess of Bartholin's gland  Tobacco use disorder  LGSIL  Positive GBS test  BHARTI I  Irregular periods  HPV positive  Eczema  Anxiety  Depression  ASCUS    Past Surgical History:    Tubal ligation  Colposcopy x 2     Family History:    Father: diabetes    Social History:  The patient presents to the ED alone.  The patient presents to the ED via car.  PCP: Tania Schofield     Physical Exam     Patient Vitals for the past 24 hrs:   BP Temp Temp src Pulse Resp SpO2   02/14/23 1611 117/84 97.8  F (36.6  C) Temporal 104 18 100 %        Physical Exam  Vitals and nursing note reviewed.   Eyes:      Conjunctiva/sclera: Conjunctivae normal.   Pulmonary:      Effort: Pulmonary effort is normal.   Skin:     General: Skin is warm.      Coloration: Skin is not jaundiced.      Findings: Abscess present. No erythema.      Comments: Left axilla swelling and abscess. No cellulitis or induration. Tender to touch. Fluctuant without crepitus.   Neurological:      Mental Status: She is alert and oriented to person, place, and time.  Mental status is at baseline.   Psychiatric:         Mood and Affect: Mood normal.         Behavior: Behavior normal.           Emergency Department Course     Procedures       Incision and Drainage     Procedure: Incision and Drainage     Consent: Verbal    Indication: Abscess    Location: Left Axilla    Size: 1 cm    Ultrasound Guidance: POCUS was used to confirm presence of abscess with Dr. Colvin, but was not used for guidance during I/D procedure.    Preparation: Alcohol     Anesthesia/Sedation: Lidocaine with Epinephrine - 1%     Procedure Detail:    Aspiration: No  Incision Type: Stab  Scalpel: 11  Lesion Management: Probed and deloculated   Wound Management: Left open   Packing: None     Patient Status: The patient tolerated the procedure well: Yes. There were no complications.          Emergency Department Course & Assessments:       Social Determinants of Health affecting care:   None    Assessments:  1812 I obtained history and examined the patient as noted above.  1830 I rechecked the patient with Dr. Corbin who performed a bedside ultrasound.  1840 I rechecked the patient and performed an I&D.    Disposition:  The patient was discharged to home.     Impression & Plan      Medical Decision Making:  Ana Enriquez is a 32 year old female who presents with swelling and pain to her let axilla    She has signs of an abscess. I&D performed and successfully expressed purulent drainage; see below procedure note. No signs of serious infx like necrotizing fascitis or rapid cellulitis given fever curve, spread of erythema over past 24 hours, no crepitance to tissues, no sensation change to tissues.    Plan home w/ f/u of surgery or primary  Abx given. Warning signs for wound given on discharge instructions and verbally; see d/c instructions.      Diagnosis:    ICD-10-CM    1. Abscess of left axilla  L02.412            Discharge Medications:  New Prescriptions    DOXYCYCLINE HYCLATE (VIBRAMYCIN) 100 MG CAPSULE     Take 1 capsule (100 mg) by mouth 2 times daily for 7 days          Scribe Disclosure:  I, Zach Lilian, am serving as a scribe at 6:20 PM on 2/14/2023 to document services personally performed by Satnam Yoder PA-C, based on my observations and the provider's statements to me.   2/14/2023   Satnam Yoder PA-C Kruger, Jacob C, PA-C  02/15/23 1059

## 2023-02-15 NOTE — ED NOTES
Abscess site dressed with gauze to promote drainage per provider instruction. Pt verbalized understanding of wound care and discharge.

## 2024-05-12 PROCEDURE — 99283 EMERGENCY DEPT VISIT LOW MDM: CPT

## 2024-05-13 ENCOUNTER — HOSPITAL ENCOUNTER (EMERGENCY)
Facility: CLINIC | Age: 34
Discharge: HOME | End: 2024-05-13
Attending: EMERGENCY MEDICINE | Admitting: EMERGENCY MEDICINE

## 2024-05-13 VITALS
TEMPERATURE: 97.9 F | HEART RATE: 82 BPM | OXYGEN SATURATION: 97 % | WEIGHT: 158 LBS | SYSTOLIC BLOOD PRESSURE: 122 MMHG | BODY MASS INDEX: 31.02 KG/M2 | RESPIRATION RATE: 18 BRPM | DIASTOLIC BLOOD PRESSURE: 88 MMHG | HEIGHT: 60 IN

## 2024-05-13 DIAGNOSIS — R20.2 PARESTHESIA: ICD-10-CM

## 2024-05-13 DIAGNOSIS — S62.102A CLOSED FRACTURE OF LEFT WRIST, INITIAL ENCOUNTER: ICD-10-CM

## 2024-05-13 RX ORDER — OXYCODONE HYDROCHLORIDE 5 MG/1
5 TABLET ORAL EVERY 6 HOURS PRN
Qty: 10 TABLET | Refills: 0 | Status: SHIPPED | OUTPATIENT
Start: 2024-05-13

## 2024-05-13 RX ORDER — OXYCODONE HYDROCHLORIDE 5 MG/1
5 TABLET ORAL EVERY 6 HOURS PRN
Qty: 10 TABLET | Refills: 0 | Status: SHIPPED | OUTPATIENT
Start: 2024-05-13 | End: 2024-05-13

## 2024-05-13 ASSESSMENT — COLUMBIA-SUICIDE SEVERITY RATING SCALE - C-SSRS
6. HAVE YOU EVER DONE ANYTHING, STARTED TO DO ANYTHING, OR PREPARED TO DO ANYTHING TO END YOUR LIFE?: NO
1. IN THE PAST MONTH, HAVE YOU WISHED YOU WERE DEAD OR WISHED YOU COULD GO TO SLEEP AND NOT WAKE UP?: NO
2. HAVE YOU ACTUALLY HAD ANY THOUGHTS OF KILLING YOURSELF IN THE PAST MONTH?: NO

## 2024-05-13 NOTE — LETTER
May 13, 2024      To Whom It May Concern:      Ana Enriquez was seen in our Emergency Department today, 05/13/24.  I expect her condition to improve over the next 2-3 days.  She may return to work when improved.    Sincerely,        Klarissa Colón RN

## 2024-05-13 NOTE — ED TRIAGE NOTES
"Pt presents with wrist pain and swelling in L wrist. Pt was splinted at [facility] 2 days ago. Pt reports worsening pain despite trying ibuprofen, tylenol and icing. Pt describes increased swelling in wrist and altered sensation in fingers. Pt able to feel touch but it feels \"mushy.\" A & Ox4     Triage Assessment (Adult)       Row Name 05/13/24 0010          Triage Assessment    Airway WDL WDL        Respiratory WDL    Respiratory WDL WDL        Cognitive/Neuro/Behavioral WDL    Cognitive/Neuro/Behavioral WDL WDL                     "

## 2024-05-13 NOTE — LETTER
May 13, 2024      To Whom It May Concern:      Ana Enriquez was seen in our Emergency Department today, 05/13/24.  I expect her condition to improve over the next 2-3 days.  She may return to work when improved.    Sincerely,        Klarissa HO RN

## 2024-05-13 NOTE — ED PROVIDER NOTES
Emergency Department Note      History of Present Illness     Chief Complaint  Wrist Pain    HPI  Ana Enriquez is a 33 year old female who presents to the ED for evaluation of wrist pain. The patient reports that she had an x-ray at [facility] 2 days ago that showed a broken bone near her left wrist. She denies that she broke both of the bones of her wrist. The [facility] staff wrapped her forearm and told her to comeback in 5 days for reevaluation. The patient's wrist pain and swelling has been increasing since her arm was wrapped. She has been unsuccessfully attempting to control the pain with ibuprofen and tylenol. She last took ibuprofen 2 hours agoAlso, she has numbness in left hand and fingers. The patient denies having new falls or trauma. She denies having any allergies to pain medications. She denies having any problem with any other part of her body.     Independent Historian  None    Review of External Notes  None  Past Medical History   Medical History and Problem List  Cervical intraepithelial neoplasia I   Bipolar 1 disorder   Migraine  HSV-1 infection   Enlarged thyroid  Bartholin's gland abscess  Tobacco use disorder  Depression  Anxiety  HPV positive    Medications  Dicyclomine  Ondansetron  Fluticasone  Omeprazole  Naproxen    Surgical History   Tubal ligation  COLPOSCOPY X 3  Physical Exam   Patient Vitals for the past 24 hrs:   BP Temp Temp src Pulse Resp SpO2 Height Weight   05/13/24 0008 122/88 97.9  F (36.6  C) Temporal 82 18 97 % 1.524 m (5') 71.7 kg (158 lb)     Physical Exam      CV: ppi, regular   Resp: speaking in full sentences without any resp distress  Skin: warm dry well perfused  Neuro: Alert, no gross motor or sensory deficits,  gait stable      Extremity: Left upper extremity has a dorsal volar splint in place.  No elbow effusion.  No significant soft tissue swelling proximal to the cast.  There is mild soft tissue swelling about all 5 fingers.  There is no abnormal pallor nor is there  purplish discoloration.  Cap refill is 3 seconds.  She is able to flex and extend all 5 fingers.  Sensation is intact to light touch.2    ED Course    Discussion of Management  None    Social Determinants of Health adding to complexity of care  None    ED Course  ED Course as of 05/13/24 0126   Mon May 13, 2024   0022 I obtained history and examined the patient as noted above.      Medical Decision Making / Diagnosis   EDUARDO Enriquez is a 33 year old female with recent left distal forearm fracture by report currently in a forearm splint reportedly going to have surgery with [facility] in the near future.  Here with paresthesias and uncontrolled pain.  No new falls or trauma.  Examination that not suggestive of compartment syndrome or neurovascular compromise.  Her Ace wrap was removed and replaced with a new Ace wrap that was loosely wrapped.  We ensured that her motor and sensory function and perfusion were intact after that Ace wrap was placed.  Discharge home will help with a short course of analgesics. She was comfortable with that plan.  Understands splint cares and when to return here to the ER.    Disposition  The patient was discharged.     ICD-10 Codes:    ICD-10-CM    1. Closed fracture of left wrist, initial encounter  S62.102A       2. Paresthesia  R20.2         Discharge Medications  Discharge Medication List as of 5/13/2024 12:46 AM        Scribe Disclosure:  I, Luiz Brown, am serving as a scribe at 12:48 AM on 5/13/2024 to document services personally performed by Ramiro Lamb MD, based on my observations and the provider's statements to me.   [employer]      Ramiro Lamb MD  05/13/24 0327

## (undated) RX ORDER — LIDOCAINE HYDROCHLORIDE AND EPINEPHRINE 10; 10 MG/ML; UG/ML
INJECTION, SOLUTION INFILTRATION; PERINEURAL
Status: DISPENSED
Start: 2023-02-14